# Patient Record
Sex: FEMALE | Race: WHITE
[De-identification: names, ages, dates, MRNs, and addresses within clinical notes are randomized per-mention and may not be internally consistent; named-entity substitution may affect disease eponyms.]

---

## 2017-05-12 NOTE — ER DOCUMENT REPORT
ED General





- General


Chief Complaint: Rectal Bleeding


Stated Complaint: BLOOD IN STOOL,ABDOMINAL PAIN,FEVER


Time Seen by Provider: 17 22:23


Notes: 


Patient is a 26-year-old female who presents with 1 year of rectal bleeding, 

daily abdominal pain, no prior GI evaluation although she was evaluated in the 

emergency department on 2 separate occasions approximately one year ago with a 

CT scan of the abdomen and pelvis 2 with noted to be normal and presents with 

concerns that her stool now also contains mucus.  She states that she often has 

bloody, loose bowel movements but has not ever had mucus in the stool.  She 

also notes an associated diffuse abdominal cramping that is moderate in nature.

  Nothing improves or worsens her symptoms.  She denies any associated vomiting 

but does note associated nausea.  Notes that shes had a subjective fever at 

home but has not recorded temperature.  She denies any dysuria, vaginal 

bleeding or vaginal discharge.  No sitter abdominal pain today is unchanged 

from her daily abdominal pain.  States she was unable to follow-up with GI 

medicine last year due to lack of insurance coverage which she now has.





TRAVEL OUTSIDE OF THE [place] IN LAST 30 DAYS: No





- Related Data


Allergies/Adverse Reactions: 


 





latex [Latex] Allergy (Severe, Verified 16 09:34)


 swelling











Past Medical History





- General


Information source: Patient





- Social History


Smoking Status: Never Smoker


Frequency of alcohol use: None


Drug Abuse: None


Lives with: Spouse/Significant other


Family History: Reviewed & Not Pertinent


Patient has suicidal ideation: No


Patient has homicidal ideation: No





- Past Medical History


Cardiac Medical History: Reports: Hx Heart Murmur - hx of


   Denies: Hx Hypertension


Neurological Medical History: Denies: Hx Cerebrovascular Accident, Hx Seizures


Renal/ Medical History: Denies: Hx Peritoneal Dialysis


GI Medical History: Reports: Hx Gastroesophageal Reflux Disease, Hx Ulcer - 

gastric hx of.  Denies: Hx Hiatal Hernia


Psychiatric Medical History: 


Infectious Medical History: Denies: Hx HIV


Past Surgical History: Reports: Hx  Section - x 3, Hx Tubal Ligation





- Immunizations


Immunizations up to date: Yes


Hx Diphtheria, Pertussis, Tetanus Vaccination: Yes - 





Review of Systems





- Review of Systems


Notes: 


Constitutional: Negative for fever.


HENT: Negative for sore throat.


Eyes: Negative for visual changes.


Cardiovascular: Negative for chest pain.


Respiratory: Negative for shortness of breath.


Gastrointestinal: Positive for abdominal pain and hematochezia


Genitourinary: Negative for dysuria.


Musculoskeletal: Negative for back pain.


Skin: Negative for rash.


Neurological: Negative for headaches, weakness or numbness.





10 point ROS negative except as marked above and in HPI.





Physical Exam





- Vital signs


Vitals: 


 











Temp Pulse Resp BP Pulse Ox


 


 97.9 F   60   20   127/73 H  96 


 


 17 21:13  17 21:13  17 21:13  17 21:13  17 21:13











Interpretation: Normal


Notes: 


PHYSICAL EXAMINATION:





GENERAL: Well-appearing, well-nourished and in no acute distress.





HEAD: Atraumatic, normocephalic.





EYES: Pupils equal round and reactive to light, extraocular movements intact, 

sclera anicteric, conjunctiva are normal.





ENT: nares patent, oropharynx clear without exudates.  Moist mucous membranes.





NECK: Normal range of motion, supple without lymphadenopathy





LUNGS: Breath sounds clear to auscultation bilaterally and equal.  No wheezes 

rales or rhonchi.





HEART: Regular rate and rhythm without murmurs





ABDOMEN: Soft, nontender, normoactive bowel sounds.  No guarding, no rebound.  

No masses appreciated.





Rectal: No gross blood or masses.  Brown stool.





EXTREMITIES: Normal range of motion, no pitting or edema.  No cyanosis.





NEUROLOGICAL: No focal neurological deficits. Moves all extremities 

spontaneously and on command.





PSYCH: Normal mood, normal affect.





SKIN: Warm, Dry, normal turgor, no rashes or lesions noted.





Course





- Re-evaluation


Re-evalutation: 


17 23:13


Patient presents with nearly one year of rectal bleeding and nearly daily 

abdominal pain.  She presents today now with some mucus in the stool which is 

approximately come to the emergency department.  She isn't nauseated without 

vomiting.  Overall no focal abdominal tenderness on exam and she is otherwise 

well in appearance, vitals normal limits.  Primary concern at this time would 

be for an inflammatory bowel disease given the duration of patient's symptoms 

and she has not yet been able to follow-up with GI.  However, patient now has 

insurance she should be able to get expeditious outpatient GI evaluation 

including colonoscopy which I believe is the most diagnostically appropriate 

tests.  Rectal exam here without any ranjan blood.  Labs are otherwise 

unremarkable plan for discharge home with recommendations for close outpatient 

GI follow-up.








17 23:47


Laboratories overall unremarkable.  Stool guaiac is negative.  I have recommend 

close GI follow-up.At this time will discharge with return precautions and 

follow-up recommendations.  Verbal discharge instructions given a the bedside 

and opportunity for questions given. Medication warnings reviewed. Patient is 

in agreement with this plan and has verbalized understanding of return 

precautions and the need for primary care follow-up in the next 24-72 hours.





- Vital Signs


Vital signs: 


 











Temp Pulse Resp BP Pulse Ox


 


 97.9 F   60   12   123/67   97 


 


 17 21:13  17 21:13  17 23:45  17 23:01  17 23:45














- Laboratory


Result Diagrams: 


 17 23:01





 17 23:01


Laboratory results interpreted by me: 


 











  17





  23:01


 


Plt Count  100 L














Discharge





- Discharge


Clinical Impression: 


 Rectal bleeding





Abdominal pain


Qualifiers:


 Abdominal location: generalized Qualified Code(s): R10.84 - Generalized 

abdominal pain





Condition: Good


Disposition: HOME, SELF-CARE


Additional Instructions: 


You need to follow-up with GI medicine urgently given the duration of your 

symptoms.  Your labs are otherwise normal today.  Please take the Zofran that 

has been given to you as needed for nausea.  Return if you have a fever greater 

than 101F, pass out, have worsening abdominal pain, or have any other symptoms 

that are worrisome to you.

## 2017-10-03 NOTE — ER DOCUMENT REPORT
ED Medical Screen (RME)





- General


Chief Complaint: Chest Pain


Stated Complaint: CHEST PAIN


Time Seen by Provider: 10/03/17 01:02


Mode of Arrival: Ambulatory


Information source: Patient


Notes: 





Patient presents complaining of right-sided chest pain for the past 2 days.  

Patient does complain of nausea with headache.  Patient states that she had a 

syncopal episode around 7 PM while standing today.  Patient denies any cough or 

cold symptoms.  Patient does report a family history history of early heart 

disease.





hx: Tubal ligation, 


TRAVEL OUTSIDE OF THE [place] IN LAST 30 DAYS: No





- Related Data


Allergies/Adverse Reactions: 


 





latex [Latex] Allergy (Severe, Verified 16 09:34)


 swelling











Past Medical History





- Past Medical History


Cardiac Medical History: Reports: Hx Heart Murmur - hx of


   Denies: Hx Hypertension


Neurological Medical History: Denies: Hx Cerebrovascular Accident, Hx Seizures


Renal/ Medical History: Denies: Hx Peritoneal Dialysis


GI Medical History: Reports: Hx Gastroesophageal Reflux Disease, Hx Ulcer - 

gastric hx of.  Denies: Hx Hiatal Hernia


Psychiatric Medical History: 


Infectious Medical History: Denies: Hx HIV


Past Surgical History: Reports: Hx  Section - x 3, Hx Tubal Ligation





- Immunizations


Immunizations up to date: Yes


Hx Diphtheria, Pertussis, Tetanus Vaccination: Yes - 





Physical Exam





- Vital signs


Vitals: 





 











Temp Pulse Resp BP Pulse Ox


 


 98.3 F   88   18   121/72   96 


 


 10/02/17 23:23  10/02/17 23:23  10/02/17 23:23  10/02/17 23:23  10/02/17 23:23














Course





- Vital Signs


Vital signs: 





 











Temp Pulse Resp BP Pulse Ox


 


 98.3 F   88   18   121/72   96 


 


 10/02/17 23:23  10/02/17 23:23  10/02/17 23:23  10/02/17 23:23  10/02/17 23:23

## 2017-10-03 NOTE — ER DOCUMENT REPORT
ED General





- General


Chief Complaint: Chest Pain


Stated Complaint: CHEST PAIN


Time Seen by Provider: 10/03/17 01:02


Mode of Arrival: Ambulatory


Notes: 





Patient is a 26-year-old female presents with complaint of pain that goes from 

her right upper chest into her right shoulder and upper right neck into the 

back of her head and then down her right arm.  She said the pain started in her 

right chest and shoulder area before it started to radiate down her arm and up 

into her head.  She said she also passed out today around 7 PM.  She said she 

got up to go get water.  She says she passed out when she walked to the sink.  

No fevers.  No vomiting.  No trauma.  No diarrhea.  No history of cardiac 

issues.  She does not take any medications.  She is otherwise healthy.


TRAVEL OUTSIDE OF THE [place] IN LAST 30 DAYS: No





- Related Data


Allergies/Adverse Reactions: 


 





latex [Latex] Allergy (Severe, Verified 16 09:34)


 swelling











Past Medical History





- General


Information source: Patient





- Social History


Smoking Status: Unknown if Ever Smoked


Frequency of alcohol use: None


Drug Abuse: None


Family History: Reviewed & Not Pertinent


Patient has suicidal ideation: No


Patient has homicidal ideation: No





- Past Medical History


Cardiac Medical History: Reports: Hx Heart Murmur - hx of


   Denies: Hx Hypertension


Neurological Medical History: Denies: Hx Cerebrovascular Accident, Hx Seizures


Renal/ Medical History: Denies: Hx Peritoneal Dialysis


GI Medical History: Reports: Hx Gastroesophageal Reflux Disease, Hx Ulcer - 

gastric hx of.  Denies: Hx Hiatal Hernia


Psychiatric Medical History: 


Infectious Medical History: Denies: Hx HIV


Past Surgical History: Reports: Hx  Section - x 3, Hx Tubal Ligation





- Immunizations


Immunizations up to date: Yes


Hx Diphtheria, Pertussis, Tetanus Vaccination: Yes - 





Review of Systems





- Review of Systems


Notes: 





My Normal Review Basic





REVIEW OF SYSTEMS:


CONSTITUTIONAL :  Denies fever,  chills, or sweats.  Denies recent illness.


EENT:   Denies eye, ear, throat, or mouth pain or symptoms.  Denies nasal or 

sinus congestion.


CARDIOVASCULAR: Right upper chest pain


RESPIRATORY:  Denies cough, cold, or chest congestion.  Denies shortness of 

breath, difficulty breathing, or wheezing.


GASTROINTESTINAL:  Denies abdominal pain.  Denies nausea, vomiting, or 

diarrhea.  Denies constipation.  Last BM: 


MUSCULOSKELETAL: Right shoulder pain 


SKIN:   Denies rash or skin lesions..


NEUROLOGICAL:  Denies altered mental status or loss of consciousness.  Denies 

headache.  Denies weakness or paralysis or loss of use of either side.  Denies 

problems with gait or speech.  Denies sensory or motor loss.


ALL OTHER SYSTEMS REVIEWED AND NEGATIVE.





Physical Exam





- Vital signs


Vitals: 


 











Temp Pulse Resp BP Pulse Ox


 


 98.3 F   88   18   121/72   96 


 


 10/02/17 23:23  10/02/17 23:23  10/02/17 23:23  10/02/17 23:23  10/02/17 23:23














- Notes


Notes: 





General Appearance: Well nourished, alert, cooperative, no acute distress, 

moderate obvious discomfort.


Vitals: reviewed, See vital signs table.


Head: no swelling or tenderness to the head


Eyes: PERRL, EOMI, Conjuctiva clear


Mouth: No decreasd moisture


Chest wall: Pain to palpation over the right upper chest wall near the shoulder.


Neck: Supple, pain to palpation over the right trapezius muscle going into the 

right cervical paraspinal musculature.


Lungs: No wheezing, No rales, No rhonci, No accessory muscle use, good air 

exchange bilaterally.


Heart: Normal rate, Regular rythm, No murmur, no rub


Abdomen: Normal BS, soft, No rigidity, No abdominal tenderness, No guarding, no 

rebound, no abdominal masses, no organomegaly


Extremities: strength 5/5 in all extremities, good pulses in all extremities, 

pain to palpation of the right shoulder and right trapezius muscle.  Pain with 

movement of right shoulder., no edema.


Skin: warm, dry, appropriate color, no rash


Neuro: speech clear, oriented x 3, normal affect, responds appropriately to 

questions.





Course





- Re-evaluation


Re-evalutation: 





10/03/17 03:52


On exam patient's pain is easily reproducible with palpation does follow a 

cervical type pattern that the pain starts along the trapezius muscles and 

cervical paraspinal musculature and then radiates up into her head and then 

down into her right shoulder down her right arm and into her right upper chest.

  All these areas are very tender to touch.  I did obtain a CT of her chest 

being that the pain did come on suddenly and does have this lateralizing type 

of symptom from chest area and therefore I want to rule out possibility of 

dissection.  CT was negative.  This time patient is safe to be discharged home.

  I encouraged her return to ER immediately if she has worsening pain, focal to 

bleeding, fevers, or feels unwell.  Patient will be placed on muscle relaxer as 

well as anti-inflammatory medications.  Patient agrees with plan and will be 

discharged home.





Dictation of this chart was performed using voice recognition software; 

therefore, there may be some unintended grammatical errors.





- Vital Signs


Vital signs: 


 











Temp Pulse Resp BP Pulse Ox


 


 98.3 F   88   21 H  121/72   96 


 


 10/02/17 23:23  10/02/17 23:23  10/03/17 02:45  10/02/17 23:23  10/03/17 02:45














- Laboratory


Result Diagrams: 


 10/03/17 01:45





 10/03/17 01:45


Laboratory results interpreted by me: 


 











  10/03/17 10/03/17 10/03/17





  01:35 01:45 01:45


 


Plt Count   101 L 


 


Chloride    109 H


 


Urine Blood  SMALL H  


 


Urine Urobilinogen  4.0 H  


 


Ur Leukocyte Esterase  MODERATE H  














- EKG Interpretation by Me


Additional EKG results interpreted by me: 





10/03/17 01:49


EKG is reviewed and interpreted by me.  EKG shows normal sinus rhythm with rate 

of 81 bpm.  No ST segment elevation or depression.  No ischemic T-wave 

inversions.  DE interval, QRS duration, QTc intervals are within normal range.  

No old EKG available for comparison.


10/03/17 02:57





EKG #2 is reviewed and interpreted by me.  EKG shows normal sinus rhythm with a 

rate of 62 bpm.  No ST segment elevation or depression.  Lead V5 is a large 

amount of artifact making it difficult to interpret.  No ischemic appearing T 

waves.  DE interval, QRS duration, QTc intervals are within normal range.





Discharge





- Discharge


Clinical Impression: 


 Neck pain





Headache


Qualifiers:


 Headache type: unspecified Headache chronicity pattern: acute headache 

Intractability: not intractable Qualified Code(s): R51 - Headache





Chest pain


Qualifiers:


 Chest pain type: unspecified Qualified Code(s): R07.9 - Chest pain, unspecified





Condition: Good


Disposition: HOME, SELF-CARE


Additional Instructions: 


Your laboratory evaluation does not show any concerning abnormalities.  CT scan 

was performed of your chest to rule out blood clots in your lungs and something 

called an aortic dissection.  The CT scan was normal.  Your pain on exam seems 

very consistent with that of pain radiating from the neck.  I will place you on 

muscle relaxers and anti-inflammatories for the next few days.  We will have 

you use warm compresses.  I will give you a few days off work.  I encourage you 

to return to the ER if you have worsening pain, difficulty breathing, or fevers.


Prescriptions: 


Cyclobenzaprine HCl [Flexeril 10 mg Tablet] 10 mg PO TIDP PRN #15 tab


 PRN Reason: 


Naproxen [Naprosyn 250 mg Tablet] 250 mg PO BID #15 tablet


Forms:  Return to Work

## 2017-10-03 NOTE — RADIOLOGY REPORT (SQ)
EXAM: CT ANGIOGRAM OF THE CHEST USING INTRAVENOUS ADMINISTRATION

OF NONIONIC IODINATED CONTRAST MATERIAL.



CLINICAL INDICATION: Chest pain. Right shoulder pain. Right neck

pain.



COMPARISON:  Today's chest radiographs.



TECHNIQUE: Using helical technique, thin section axial images

were performed through the chest after the uncomplicated

intravenous administration of 100 mL Isovue-370.  Axially

acquired data was then transferred to a dedicated CT workstation

to facilitate parasagittal, coronal and volumetric 3-D

reconstructions.  Representative volumetric 3-D reconstructions

for this examination were permanently stored on the PACS system.



FINDINGS: Great vessels of the chest are normal. No pulmonary

embolism. No thoracic aortic aneurysm or dissection given cardiac

motion artifact. Greatest sagittal diameter of the ascending

thoracic aorta measures 2.2 cm.



Cardiac size and contour normal. No pericardial effusion.

Probable right basilar scar versus atelectasis. Lungs are

otherwise clear. No pleural effusions or pneumothorax.



Bones are normal.



IMPRESSION:

Minimal right basilar scar versus atelectasis. Otherwise, normal

chest CTA examination. No pulmonary embolism or thoracic aortic

aneurysm/dissection.

## 2017-10-03 NOTE — RADIOLOGY REPORT (SQ)
EXAM DESCRIPTION: 



CHEST PA/LAT



CLINICAL HISTORY: 



26 years, Female, cp



COMPARISON:

Chest radiographs of December 30, 2016.



NUMBER OF VIEWS:

2



TECHNIQUE:

Radiographic technique.



LIMITATIONS:

None.



FINDINGS:



Cardiac size and pulmonary vasculature are normal. Lungs are

clear. No pleural effusions or pneumothorax. Bones are intact on

this single view. No free peritoneal gas layering under either

hemidiaphragm. 



IMPRESSION:



Normal chest radiographs.

 



 2011 [employer]- All Rights Reserved

## 2018-05-18 ENCOUNTER — HOSPITAL ENCOUNTER (EMERGENCY)
Dept: HOSPITAL 62 - ER | Age: 28
Discharge: HOME | End: 2018-05-18
Payer: SELF-PAY

## 2018-05-18 VITALS — DIASTOLIC BLOOD PRESSURE: 80 MMHG | SYSTOLIC BLOOD PRESSURE: 115 MMHG

## 2018-05-18 DIAGNOSIS — N39.0: Primary | ICD-10-CM

## 2018-05-18 DIAGNOSIS — Z98.51: ICD-10-CM

## 2018-05-18 DIAGNOSIS — Z91.040: ICD-10-CM

## 2018-05-18 DIAGNOSIS — R10.30: ICD-10-CM

## 2018-05-18 LAB
ADD MANUAL DIFF: NO
ALBUMIN SERPL-MCNC: 4.4 G/DL (ref 3.5–5)
ALP SERPL-CCNC: 54 U/L (ref 38–126)
ALT SERPL-CCNC: 23 U/L (ref 9–52)
ANION GAP SERPL CALC-SCNC: 14 MMOL/L (ref 5–19)
APPEARANCE UR: (no result)
APTT PPP: YELLOW S
AST SERPL-CCNC: 16 U/L (ref 14–36)
BASOPHILS # BLD AUTO: 0 10^3/UL (ref 0–0.2)
BASOPHILS NFR BLD AUTO: 0.3 % (ref 0–2)
BILIRUB DIRECT SERPL-MCNC: 0.3 MG/DL (ref 0–0.4)
BILIRUB SERPL-MCNC: 0.7 MG/DL (ref 0.2–1.3)
BILIRUB UR QL STRIP: NEGATIVE
BUN SERPL-MCNC: 16 MG/DL (ref 7–20)
CALCIUM: 9.8 MG/DL (ref 8.4–10.2)
CHLORIDE SERPL-SCNC: 107 MMOL/L (ref 98–107)
CO2 SERPL-SCNC: 24 MMOL/L (ref 22–30)
EOSINOPHIL # BLD AUTO: 0.2 10^3/UL (ref 0–0.6)
EOSINOPHIL NFR BLD AUTO: 2.5 % (ref 0–6)
ERYTHROCYTE [DISTWIDTH] IN BLOOD BY AUTOMATED COUNT: 13.7 % (ref 11.5–14)
GLUCOSE SERPL-MCNC: 95 MG/DL (ref 75–110)
GLUCOSE UR STRIP-MCNC: NEGATIVE MG/DL
HCT VFR BLD CALC: 43 % (ref 36–47)
HGB BLD-MCNC: 14.7 G/DL (ref 12–15.5)
KETONES UR STRIP-MCNC: NEGATIVE MG/DL
LIPASE SERPL-CCNC: 45.9 U/L (ref 23–300)
LYMPHOCYTES # BLD AUTO: 2.1 10^3/UL (ref 0.5–4.7)
LYMPHOCYTES NFR BLD AUTO: 21.7 % (ref 13–45)
MCH RBC QN AUTO: 30.6 PG (ref 27–33.4)
MCHC RBC AUTO-ENTMCNC: 34.1 G/DL (ref 32–36)
MCV RBC AUTO: 90 FL (ref 80–97)
MONOCYTES # BLD AUTO: 0.7 10^3/UL (ref 0.1–1.4)
MONOCYTES NFR BLD AUTO: 7.2 % (ref 3–13)
NEUTROPHILS # BLD AUTO: 6.7 10^3/UL (ref 1.7–8.2)
NEUTS SEG NFR BLD AUTO: 68.3 % (ref 42–78)
NITRITE UR QL STRIP: NEGATIVE
PH UR STRIP: 5 [PH] (ref 5–9)
PLATELET # BLD: 123 10^3/UL (ref 150–450)
POTASSIUM SERPL-SCNC: 4.3 MMOL/L (ref 3.6–5)
PROT SERPL-MCNC: 6.7 G/DL (ref 6.3–8.2)
PROT UR STRIP-MCNC: NEGATIVE MG/DL
RBC # BLD AUTO: 4.8 10^6/UL (ref 3.72–5.28)
SODIUM SERPL-SCNC: 144.6 MMOL/L (ref 137–145)
SP GR UR STRIP: 1.02
TOTAL CELLS COUNTED % (AUTO): 100 %
UROBILINOGEN UR-MCNC: NEGATIVE MG/DL (ref ?–2)
WBC # BLD AUTO: 9.8 10^3/UL (ref 4–10.5)

## 2018-05-18 PROCEDURE — 99284 EMERGENCY DEPT VISIT MOD MDM: CPT

## 2018-05-18 PROCEDURE — 96375 TX/PRO/DX INJ NEW DRUG ADDON: CPT

## 2018-05-18 PROCEDURE — 36415 COLL VENOUS BLD VENIPUNCTURE: CPT

## 2018-05-18 PROCEDURE — 82272 OCCULT BLD FECES 1-3 TESTS: CPT

## 2018-05-18 PROCEDURE — 81001 URINALYSIS AUTO W/SCOPE: CPT

## 2018-05-18 PROCEDURE — 74177 CT ABD & PELVIS W/CONTRAST: CPT

## 2018-05-18 PROCEDURE — 80053 COMPREHEN METABOLIC PANEL: CPT

## 2018-05-18 PROCEDURE — 81025 URINE PREGNANCY TEST: CPT

## 2018-05-18 PROCEDURE — 83690 ASSAY OF LIPASE: CPT

## 2018-05-18 PROCEDURE — 96365 THER/PROPH/DIAG IV INF INIT: CPT

## 2018-05-18 PROCEDURE — 85025 COMPLETE CBC W/AUTO DIFF WBC: CPT

## 2018-05-18 NOTE — RADIOLOGY REPORT (SQ)
EXAM DESCRIPTION:  CT ABD/PELVIS WITH IV ONLY



COMPLETED DATE/TIME:  5/18/2018 12:02 pm



REASON FOR STUDY:  RLQ pain; eval appendix



COMPARISON:  5/30/2016.



TECHNIQUE:  CT scan of the abdomen and pelvis performed using helical scanning technique with dynamic
 intravenous contrast injection.  No oral contrast. Images reviewed with lung, soft tissue, and bone 
windows. Reconstructed coronal and sagittal MPR images reviewed. Delayed images for evaluation of the
 urinary system also acquired. All images stored on PACS.

All CT scanners at this facility use dose modulation, iterative reconstruction, and/or weight based d
osing when appropriate to reduce radiation dose to as low as reasonably achievable (ALARA).

[facility]: Dose Right  [facility]: CareDose    [facility]: Dose Right    [facility]: Teradose 4D    [facility]: [employer] Technologies



CONTRAST TYPE AND DOSE:  contrast/concentration: Isovue 370.00 mg/ml; Total Contrast Delivered: 64.0 
ml; Total Saline Delivered: 65.0 ml



RENAL FUNCTION:  None required. The patient is less than 50 years old.



RADIATION DOSE:  CT Rad equipment meets quality standard of care and radiation dose reduction techniq
ues were employed. CTDIvol: 5.0 - 6.3 mGy. DLP: 594 mGy-cm..



LIMITATIONS:  None.



FINDINGS:  LOWER CHEST: No significant findings. No nodules or infiltrates.

LIVER: Normal size. No masses.  No dilated ducts.

SPLEEN: Normal size. No focal lesions.

PANCREAS: No masses. No significant calcifications. No adjacent inflammation or peripancreatic fluid 
collections. Pancreatic duct not dilated.

GALLBLADDER: No identified stones by CT criteria. No inflammatory changes to suggest cholecystitis.

ADRENAL GLANDS: No significant masses or asymmetry.

RIGHT KIDNEY AND URETER: No solid masses.   No significant calcifications.   No hydronephrosis or hyd
roureter.

LEFT KIDNEY AND URETER: No solid masses.   No significant calcifications.   No hydronephrosis or hydr
oureter.

AORTA AND VESSELS: No aneurysm. No dissection. Renal arteries, SMA, celiac without stenosis.

RETROPERITONEUM: No retroperitoneal adenopathy, hemorrhage or masses.

BOWEL AND PERITONEAL CAVITY: No masses or inflammatory changes. No free fluid or peritoneal masses.

APPENDIX: Normal.

PELVIS: No mass.  No free fluid. Normal bladder.

ABDOMINAL WALL: No masses. No hernias.

BONES: No significant or acute findings.

OTHER: No other significant finding.



IMPRESSION:  NO SIGNIFICANT OR ACUTE FINDING IN THE ABDOMEN OR PELVIS ON CT SCAN WITH IV CONTRAST.



TECHNICAL DOCUMENTATION:  JOB ID:  [id number]

Quality ID # [id number]: Final reports with documentation of one or more dose reduction techniques (e.g., Au
tomated exposure control, adjustment of the mA and/or kV according to patient size, use of iterative 
reconstruction technique)

 2011 [employer]- All Rights Reserved



Reading location - IP/workstation name: [email or web address]

## 2018-05-18 NOTE — ER DOCUMENT REPORT
ED GI/





- General


Chief Complaint: Abdominal Pain


Stated Complaint: ABDOMINAL PAIN


Time Seen by Provider: 18 10:26


Mode of Arrival: Ambulatory


Information source: Patient


Notes: 





Pt is a 27 year old female who presents to the ER today for 5 days of 

progressively worsening lower abdominal pain with mucous in her stool. Pt 

states her stool is normal, just there's mucous in it. She denies dysuria, 

abnormal vaginal discharge, nausea, vomiting, fever, chills. She states that 

the lower abdominal pain is worse in the center, but hurts all across her lower 

abdomen and that she can't eat because of the pain. She states the pain starts 

"as soon as I eat something." She points to her lower abdomen when she says 

this. She does state that her stools have been dark and tarry. She does admit 

that she was taking pepto bismol during the first three days. 


TRAVEL OUTSIDE OF THE [place] IN LAST 30 DAYS: No





- Related Data


Allergies/Adverse Reactions: 


 





latex [Latex] Allergy (Severe, Verified 16 09:34)


 swelling











Past Medical History





- General


Information source: Patient





- Social History


Smoking Status: Current Every Day Smoker


Frequency of alcohol use: Rare


Drug Abuse: None


Family History: Reviewed & Not Pertinent


Patient has suicidal ideation: No


Patient has homicidal ideation: No





- Past Medical History


Cardiac Medical History: Reports: Hx Heart Murmur - hx of


   Denies: Hx Hypertension


Neurological Medical History: Denies: Hx Cerebrovascular Accident, Hx Seizures


Renal/ Medical History: Denies: Hx Peritoneal Dialysis


GI Medical History: Reports: Hx Gastroesophageal Reflux Disease, Hx Ulcer - 

gastric hx of.  Denies: Hx Hiatal Hernia


Psychiatric Medical History: 


Infectious Medical History: Denies: Hx HIV


Past Surgical History: Reports: Hx  Section - x 3, Hx Tubal Ligation





- Immunizations


Immunizations up to date: Yes


Hx Diphtheria, Pertussis, Tetanus Vaccination: Yes - 





Review of Systems





- Review of Systems


Constitutional: No symptoms reported


EENT: No symptoms reported


Cardiovascular: No symptoms reported


Respiratory: No symptoms reported


Gastrointestinal: See HPI


Genitourinary: No symptoms reported


Female Genitourinary: No symptoms reported


Musculoskeletal: No symptoms reported


Skin: No symptoms reported


Hematologic/Lymphatic: No symptoms reported


Neurological/Psychological: No symptoms reported





Physical Exam





- Vital signs


Vitals: 


 











Temp Pulse Resp BP Pulse Ox


 


 98.9 F   82   16   120/82   98 


 


 05/18/18 10:13  18 10:13  18 10:13  18 10:13  18 10:13














- Notes


Notes: 





PHYSICAL EXAMINATION: 


GENERAL: Well-appearing, smiling, and in no acute distress. 


HEAD: Atraumatic, normocephalic. 


EYES: Pupils equal round and reactive to light, extraocular movements intact, 

sclera anicteric, conjunctiva are normal. 


NECK: Normal range of motion, supple without lymphadenopathy 


LUNGS: CTAB and equal. No wheezes rales or rhonchi. 


HEART: Regular rate and rhythm without murmurs


ABDOMEN: Soft, suprapubic tenderness. No guarding, no rebound 


BACK: no vertebral tenderness, normal ROM


Rectal: no hemorrhoids noted, no bleeding, normal tone, normal tenderness


GI/: no CVA tenderness


EXTREMITIES: Normal range of motion, no pitting edema. No cyanosis. 


NEUROLOGICAL: Cranial nerves grossly intact. Normal sensory/motor exams. 


PSYCH: Normal mood, normal affect. 


SKIN: Warm, Dry, normal turgor, no rashes or lesions noted 





Course





- Re-evaluation


Re-evalutation: 





18 21:09


labwork is unremarkable today except for some WBC and leukocytes, blood on UA 

today. Treated pt with rocephin while here. GI cocktail did improve her pain. I 

suspect pt likely has IBS, but she needs to get that confirmed by her pcp. 

stool occult negative. pt sent home of keflex for uti and bentyl for IBS pain. 





- Vital Signs


Vital signs: 


 











Temp Pulse Resp BP Pulse Ox


 


 98.0 F   71   16   115/80   97 


 


 18 15:02  18 15:02  18 15:02  18 15:02  18 15:02














- Laboratory


Result Diagrams: 


 18 10:54





 18 10:54


Laboratory results interpreted by me: 


 











  18





  10:54 10:54 10:54


 


Plt Count  123 L  


 


Creatinine   0.51 L 


 


Urine Blood    SMALL H


 


Ur Leukocyte Esterase    MODERATE H














Discharge





- Discharge


Clinical Impression: 


Abdominal pain


Qualifiers:


 Abdominal location: unspecified location Qualified Code(s): R10.9 - 

Unspecified abdominal pain





UTI (urinary tract infection)


Qualifiers:


 Urinary tract infection type: site unspecified Hematuria presence: without 

hematuria Qualified Code(s): N39.0 - Urinary tract infection, site not specified





Condition: Stable


Disposition: HOME, SELF-CARE


Additional Instructions: 


Return immediately for any new or worsening symptoms.





Follow up with primary care provider, call tomorrow to make followup 

appointment.


Prescriptions: 


Cephalexin Monohydrate [Keflex 500 mg Capsule] 500 mg PO BID 7 Days #14 capsule


Dicyclomine HCl [Bentyl 20 mg Tablet] 20 mg PO BID #20 tablet

## 2018-05-18 NOTE — ER DOCUMENT REPORT
ED Medical Screen (RME)





- General


Chief Complaint: Abdominal Pain


Stated Complaint: ABDOMINAL PAIN


Time Seen by Provider: 18 10:26


Notes: 





RAPID MEDICAL EVALUATION DISCLOSURE


I have seen this patient as part of a Rapid Medical Evaluation and, if 

applicable, placed any initially appropriate orders. The patient will be seen 

and fully evaluated, including a full history and physical exam, by a provider (

in Main ED or Fast Track) when a room becomes available.





------------------------------------------------------------------





27-year-old female here with complaints of lower abdominal pain nausea vomiting 

diarrhea ongoing for the past 5 days.  She reports that pain is worse with 

walking moving and car rides.  She is tried Pepto-Bismol but is unable to keep 

it down.  Denies vaginal bleeding discharge dysuria hematuria frequency 

hesitancy.





EXAM


Exquisitely tender right lower quadrant


Minimal tenderness suprapubic region


No tenderness left lower or pan-upper quadrants


TRAVEL OUTSIDE OF THE [place] IN LAST 30 DAYS: No





- Related Data


Allergies/Adverse Reactions: 


 





latex [Latex] Allergy (Severe, Verified 16 09:34)


 swelling











Past Medical History





- Past Medical History


Cardiac Medical History: Reports: Hx Heart Murmur - hx of


   Denies: Hx Hypertension


Neurological Medical History: Denies: Hx Cerebrovascular Accident, Hx Seizures


Renal/ Medical History: Denies: Hx Peritoneal Dialysis


GI Medical History: Reports: Hx Gastroesophageal Reflux Disease, Hx Ulcer - 

gastric hx of.  Denies: Hx Hiatal Hernia


Psychiatric Medical History: 


Infectious Medical History: Denies: Hx HIV


Past Surgical History: Reports: Hx  Section - x 3, Hx Tubal Ligation





- Immunizations


Immunizations up to date: Yes


Hx Diphtheria, Pertussis, Tetanus Vaccination: Yes - 





Physical Exam





- Vital signs


Vitals: 





 











Temp Pulse Resp BP Pulse Ox


 


 98.9 F   82   16   120/82   98 


 


 18 10:13  18 10:13  18 10:13  18 10:13  18 10:13














Course





- Vital Signs


Vital signs: 





 











Temp Pulse Resp BP Pulse Ox


 


 98.9 F   82   16   120/82   98 


 


 18 10:13  18 10:13  18 10:13  18 10:13  18 10:13

## 2018-05-23 ENCOUNTER — HOSPITAL ENCOUNTER (EMERGENCY)
Dept: HOSPITAL 62 - ER | Age: 28
Discharge: HOME | End: 2018-05-23
Payer: SELF-PAY

## 2018-05-23 VITALS — DIASTOLIC BLOOD PRESSURE: 51 MMHG | SYSTOLIC BLOOD PRESSURE: 111 MMHG

## 2018-05-23 DIAGNOSIS — R11.2: ICD-10-CM

## 2018-05-23 DIAGNOSIS — R19.7: ICD-10-CM

## 2018-05-23 DIAGNOSIS — R14.2: ICD-10-CM

## 2018-05-23 DIAGNOSIS — R10.84: Primary | ICD-10-CM

## 2018-05-23 DIAGNOSIS — R63.0: ICD-10-CM

## 2018-05-23 LAB
ADD MANUAL DIFF: NO
ALBUMIN SERPL-MCNC: 3.9 G/DL (ref 3.5–5)
ALP SERPL-CCNC: 55 U/L (ref 38–126)
ALT SERPL-CCNC: 26 U/L (ref 9–52)
ANION GAP SERPL CALC-SCNC: 12 MMOL/L (ref 5–19)
APPEARANCE UR: (no result)
APTT PPP: YELLOW S
AST SERPL-CCNC: 14 U/L (ref 14–36)
BASOPHILS # BLD AUTO: 0.1 10^3/UL (ref 0–0.2)
BASOPHILS NFR BLD AUTO: 0.5 % (ref 0–2)
BILIRUB DIRECT SERPL-MCNC: 0.2 MG/DL (ref 0–0.4)
BILIRUB SERPL-MCNC: 0.2 MG/DL (ref 0.2–1.3)
BILIRUB UR QL STRIP: NEGATIVE
BUN SERPL-MCNC: 14 MG/DL (ref 7–20)
CALCIUM: 9 MG/DL (ref 8.4–10.2)
CHLORIDE SERPL-SCNC: 113 MMOL/L (ref 98–107)
CO2 SERPL-SCNC: 21 MMOL/L (ref 22–30)
EOSINOPHIL # BLD AUTO: 0.4 10^3/UL (ref 0–0.6)
EOSINOPHIL NFR BLD AUTO: 3.5 % (ref 0–6)
ERYTHROCYTE [DISTWIDTH] IN BLOOD BY AUTOMATED COUNT: 13.6 % (ref 11.5–14)
GLUCOSE SERPL-MCNC: 96 MG/DL (ref 75–110)
GLUCOSE UR STRIP-MCNC: NEGATIVE MG/DL
HCT VFR BLD CALC: 40.4 % (ref 36–47)
HGB BLD-MCNC: 13.7 G/DL (ref 12–15.5)
KETONES UR STRIP-MCNC: NEGATIVE MG/DL
LIPASE SERPL-CCNC: 61.9 U/L (ref 23–300)
LYMPHOCYTES # BLD AUTO: 2.7 10^3/UL (ref 0.5–4.7)
LYMPHOCYTES NFR BLD AUTO: 27.2 % (ref 13–45)
MCH RBC QN AUTO: 30.5 PG (ref 27–33.4)
MCHC RBC AUTO-ENTMCNC: 34.1 G/DL (ref 32–36)
MCV RBC AUTO: 90 FL (ref 80–97)
MONOCYTES # BLD AUTO: 0.8 10^3/UL (ref 0.1–1.4)
MONOCYTES NFR BLD AUTO: 7.7 % (ref 3–13)
NEUTROPHILS # BLD AUTO: 6.2 10^3/UL (ref 1.7–8.2)
NEUTS SEG NFR BLD AUTO: 61.1 % (ref 42–78)
NITRITE UR QL STRIP: NEGATIVE
PH UR STRIP: 5 [PH] (ref 5–9)
PLATELET # BLD: 114 10^3/UL (ref 150–450)
POTASSIUM SERPL-SCNC: 4 MMOL/L (ref 3.6–5)
PROT SERPL-MCNC: 6.4 G/DL (ref 6.3–8.2)
PROT UR STRIP-MCNC: NEGATIVE MG/DL
RBC # BLD AUTO: 4.5 10^6/UL (ref 3.72–5.28)
SODIUM SERPL-SCNC: 145.5 MMOL/L (ref 137–145)
SP GR UR STRIP: 1.02
TOTAL CELLS COUNTED % (AUTO): 100 %
UROBILINOGEN UR-MCNC: NEGATIVE MG/DL (ref ?–2)
WBC # BLD AUTO: 10.1 10^3/UL (ref 4–10.5)

## 2018-05-23 PROCEDURE — 81001 URINALYSIS AUTO W/SCOPE: CPT

## 2018-05-23 PROCEDURE — 36415 COLL VENOUS BLD VENIPUNCTURE: CPT

## 2018-05-23 PROCEDURE — 99284 EMERGENCY DEPT VISIT MOD MDM: CPT

## 2018-05-23 PROCEDURE — 80053 COMPREHEN METABOLIC PANEL: CPT

## 2018-05-23 PROCEDURE — 83690 ASSAY OF LIPASE: CPT

## 2018-05-23 PROCEDURE — 81025 URINE PREGNANCY TEST: CPT

## 2018-05-23 PROCEDURE — 85025 COMPLETE CBC W/AUTO DIFF WBC: CPT

## 2018-05-23 PROCEDURE — 96375 TX/PRO/DX INJ NEW DRUG ADDON: CPT

## 2018-05-23 PROCEDURE — 96361 HYDRATE IV INFUSION ADD-ON: CPT

## 2018-05-23 PROCEDURE — 96374 THER/PROPH/DIAG INJ IV PUSH: CPT

## 2018-05-23 NOTE — ER DOCUMENT REPORT
ED GI/





- General


Chief Complaint: Abdominal Pain


Stated Complaint: ABDOMINAL PAIN


Time Seen by Provider: 18 00:49


Notes: 


Patient is a 27-year-old female who comes emergency department for chief 

complaint of abdominal pains, loose stools, an episode of vomiting, belching, 

and reduced appetite. Pain is in the general mid to upper abdomen today, 

although was mainly lower last time. She states she was seen about a week ago, 

had a CAT scan and a workup that showed a urinary tract infection, she has 

taken antibiotics, she is taking Bentyl for discomfort, she states she feels 

like she is getting worse.  She denies fever, she states she has had episodes 

of bloody stools in the past but none recently, she has also had episodes where 

she accidentally soils herself with stool (although none over the past couple 

of days).  She has had C-sections, she smokes, she denies any other medical 

history, surgeries, or substance use. 





TRAVEL OUTSIDE OF THE [place] IN LAST 30 DAYS: No





- Related Data


Allergies/Adverse Reactions: 


 





latex [Latex] Allergy (Severe, Verified 16 09:34)


 swelling











Past Medical History





- General


Information source: Patient





- Social History


Smoking Status: Never Smoker


Frequency of alcohol use: None


Drug Abuse: None


Lives with: Family


Family History: Reviewed & Not Pertinent





- Past Medical History


Cardiac Medical History: Reports: Hx Heart Murmur


   Denies: Hx Hypertension


Neurological Medical History: Denies: Hx Cerebrovascular Accident, Hx Seizures


Renal/ Medical History: Denies: Hx Peritoneal Dialysis


GI Medical History: Reports: Hx Gastroesophageal Reflux Disease, Hx Ulcer - 

gastric hx of.  Denies: Hx Hiatal Hernia


Psychiatric Medical History: 


Infectious Medical History: Denies: Hx HIV


Past Surgical History: Reports: Hx  Section - x 3, Hx Tubal Ligation





- Immunizations


Immunizations up to date: Yes


Hx Diphtheria, Pertussis, Tetanus Vaccination: Yes - 





Review of Systems





- Review of Systems


Constitutional: No symptoms reported


EENT: No symptoms reported


Cardiovascular: No symptoms reported


Respiratory: No symptoms reported


Gastrointestinal: See HPI


Genitourinary: No symptoms reported


Female Genitourinary: No symptoms reported


Musculoskeletal: No symptoms reported


Skin: No symptoms reported


Hematologic/Lymphatic: No symptoms reported


Neurological/Psychological: No symptoms reported





Physical Exam





- Vital signs


Vitals: 


 











Temp Pulse Resp BP Pulse Ox


 


 97.4 F   71   16   111/51 L  96 


 


 05/23/18 06:15  18 06:15  18 06:15  18 06:15  18 06:15











Interpretation: Normal





- General


General appearance: Appears well


In distress: None





- HEENT


Head: Normocephalic, Atraumatic


Eyes: Normal


Conjunctiva: Normal


Extraocular movements intact: Yes


Eyelashes: Normal


Pupils: PERRL


Mouth/Lips: Normal


Mucous membranes: Dry


Pharynx: Normal


Neck: Normal





- Respiratory


Respiratory status: No respiratory distress


Chest status: Nontender


Breath sounds: Normal


Chest palpation: Normal





- Cardiovascular


Rhythm: Regular.  No: Tachycardia


Heart sounds: Normal auscultation, S1 appreciated, S2 appreciated


Murmur: No


Normal capillary refill: Yes





- Abdominal


Inspection: Normal


Distension: No distension


Bowel sounds: Normal


Tenderness: Tender - generalized mild tenderness; non-specific, no guarding, no 

rigidity





- Back


Back: Normal, Nontender.  No: Tender





- Extremities


General upper extremity: Normal inspection, Nontender, Normal strength, Normal 

temperature


General lower extremity: Normal inspection, Nontender, Normal strength, Normal 

temperature





- Neurological


Neuro grossly intact: Yes


Cognition: Normal


Orientation: AAOx4


[place]ia Coma Scale Eye Opening: Spontaneous


Angelia Coma Scale Verbal: Oriented


Angelia Coma Scale Motor: Obeys Commands


[place] Coma Scale Total: 15


Speech: Normal


Motor strength normal: LUE, RUE, LLE, RLE


Sensory: Normal





- Psychological


Associated symptoms: Normal affect, Normal mood





- Skin


Skin Temperature: Warm


Skin Moisture: Dry


Skin Color: Normal





Course





- Re-evaluation


Re-evalutation: 


Patient vomited after initial PO meds. Still unremarkable exam, placing IV and 

giving IV meds/fluids. 





CBC unremarkable, chemistry unremarkable, urine unremarkable. Patient just had 

a Cat scan which was normal, her examination is benign. Low suspicion of abscess

, perforation, or acute abdomen otherwise. 





Patient reexamined after IV fluids and medications, she is drinking fluids 

without difficulty, she states she does feel better.  Patient was given 

dexamethasone after discussion because of her described symptoms being 

suggestive of possible inflammatory bowel disease.  She will be referred with 

primary care and gastroenterology.  Discussed return precautions in detail, 

patient states understanding and agreement.





- Vital Signs


Vital signs: 


 











Temp Pulse Resp BP Pulse Ox


 


 97.4 F   71   16   111/51 L  96 


 


 18 06:15  18 06:15  18 06:15  18 06:15  18 06:15














- Laboratory


Result Diagrams: 


 18 01:35





 18 01:35


Laboratory results interpreted by me: 


 











  18





  01:35 01:35 01:59


 


Plt Count  114 L  


 


Sodium   145.5 H 


 


Chloride   113 H 


 


Carbon Dioxide   21 L 


 


Creatinine   0.50 L 


 


Ur Leukocyte Esterase    MODERATE H














Discharge





- Discharge


Clinical Impression: 


Vomiting


Qualifiers:


 Vomiting type: unspecified Vomiting Intractability: non-intractable Nausea 

presence: with nausea Qualified Code(s): R11.2 - Nausea with vomiting, 

unspecified





Abdominal pain


Qualifiers:


 Abdominal location: generalized Qualified Code(s): R10.84 - Generalized 

abdominal pain





Condition: Stable


Disposition: HOME, SELF-CARE


Additional Instructions: 


Your symptoms are suggestive of an inflammatory bowel disease but this is not 

certain at this time.  You have been medicated for this, I recommend taking 

your nausea medication if needed, start with bland food and slowly progress.





Follow-up with the primary care referrals ([place] and [facility]).  Follow

-up with the gastroenterology referral as well ([facility]).  Return if you worsen 

including fever, uncontrolled vomiting, severe abdominal pain or swelling, or 

any other concerning symptoms.


Prescriptions: 


Famotidine [Pepcid 20 mg Tablet] 20 mg PO BID PRN #20 tablet


 PRN Reason: 


Metoclopramide HCl [Reglan] 5 mg PO ASDIR PRN #30 tablet


 PRN Reason:

## 2019-01-18 ENCOUNTER — HOSPITAL ENCOUNTER (EMERGENCY)
Dept: HOSPITAL 62 - ER | Age: 29
Discharge: LEFT BEFORE DISCHARGE | End: 2019-01-18
Payer: SELF-PAY

## 2019-01-18 DIAGNOSIS — Z53.21: Primary | ICD-10-CM

## 2019-02-25 ENCOUNTER — HOSPITAL ENCOUNTER (EMERGENCY)
Dept: HOSPITAL 62 - ER | Age: 29
Discharge: LEFT BEFORE DISCHARGE | End: 2019-02-25
Payer: SELF-PAY

## 2019-02-25 VITALS — SYSTOLIC BLOOD PRESSURE: 147 MMHG | DIASTOLIC BLOOD PRESSURE: 86 MMHG

## 2019-02-25 DIAGNOSIS — Z53.21: Primary | ICD-10-CM

## 2019-03-08 ENCOUNTER — HOSPITAL ENCOUNTER (INPATIENT)
Dept: HOSPITAL 62 - ER | Age: 29
LOS: 8 days | Discharge: HOME | DRG: 207 | End: 2019-03-16
Attending: FAMILY MEDICINE | Admitting: FAMILY MEDICINE
Payer: SELF-PAY

## 2019-03-08 DIAGNOSIS — F10.231: ICD-10-CM

## 2019-03-08 DIAGNOSIS — Z82.49: ICD-10-CM

## 2019-03-08 DIAGNOSIS — J96.01: Primary | ICD-10-CM

## 2019-03-08 DIAGNOSIS — G40.801: ICD-10-CM

## 2019-03-08 DIAGNOSIS — Z82.3: ICD-10-CM

## 2019-03-08 DIAGNOSIS — Z80.9: ICD-10-CM

## 2019-03-08 DIAGNOSIS — K21.0: ICD-10-CM

## 2019-03-08 DIAGNOSIS — Z83.3: ICD-10-CM

## 2019-03-08 DIAGNOSIS — F17.210: ICD-10-CM

## 2019-03-08 DIAGNOSIS — Z98.51: ICD-10-CM

## 2019-03-08 DIAGNOSIS — Z91.040: ICD-10-CM

## 2019-03-08 DIAGNOSIS — J69.0: ICD-10-CM

## 2019-03-08 LAB
A TYPE INFLUENZA AG: NEGATIVE
ADD MANUAL DIFF: NO
ALBUMIN SERPL-MCNC: 4.8 G/DL (ref 3.5–5)
ALP SERPL-CCNC: 85 U/L (ref 38–126)
ALT SERPL-CCNC: 20 U/L (ref 9–52)
ANION GAP SERPL CALC-SCNC: 10 MMOL/L (ref 5–19)
APPEARANCE UR: CLEAR
APTT PPP: (no result) S
ARTERIAL BLOOD FIO2: (no result)
ARTERIAL BLOOD H2CO3: 1.12 MMOL/L (ref 1.05–1.35)
ARTERIAL BLOOD HCO3: 21 MMOL/L (ref 20–24)
ARTERIAL BLOOD PCO2: 37.2 MMHG (ref 35–45)
ARTERIAL BLOOD PH: 7.37 (ref 7.35–7.45)
ARTERIAL BLOOD PO2: 119.6 MMHG (ref 80–100)
ARTERIAL BLOOD TOTAL CO2: 22.2 MMOL/L (ref 21–25)
AST SERPL-CCNC: 19 U/L (ref 14–36)
B INFLUENZA AG: NEGATIVE
BARBITURATES UR QL SCN: NEGATIVE
BASE EXCESS BLDA CALC-SCNC: -3.7 MMOL/L
BASE EXCESS BLDV CALC-SCNC: -2.3 MMOL/L
BASOPHILS # BLD AUTO: 0 10^3/UL (ref 0–0.2)
BASOPHILS NFR BLD AUTO: 0.2 % (ref 0–2)
BILIRUB DIRECT SERPL-MCNC: 0.2 MG/DL (ref 0–0.4)
BILIRUB SERPL-MCNC: 0.7 MG/DL (ref 0.2–1.3)
BILIRUB UR QL STRIP: NEGATIVE
BUN SERPL-MCNC: 9 MG/DL (ref 7–20)
CALCIUM: 9.6 MG/DL (ref 8.4–10.2)
CHLORIDE SERPL-SCNC: 107 MMOL/L (ref 98–107)
CK MB SERPL-MCNC: 0.82 NG/ML (ref ?–4.55)
CK MB SERPL-MCNC: < 0.22 NG/ML (ref ?–4.55)
CK MB SERPL-MCNC: < 0.22 NG/ML (ref ?–4.55)
CO2 SERPL-SCNC: 23 MMOL/L (ref 22–30)
EOSINOPHIL # BLD AUTO: 0.4 10^3/UL (ref 0–0.6)
EOSINOPHIL NFR BLD AUTO: 2.7 % (ref 0–6)
ERYTHROCYTE [DISTWIDTH] IN BLOOD BY AUTOMATED COUNT: 13.8 % (ref 11.5–14)
GLUCOSE SERPL-MCNC: 113 MG/DL (ref 75–110)
GLUCOSE UR STRIP-MCNC: NEGATIVE MG/DL
HCO3 BLDV-SCNC: 19.9 MMOL/L (ref 20–32)
HCT VFR BLD CALC: 42.5 % (ref 36–47)
HGB BLD-MCNC: 14.5 G/DL (ref 12–15.5)
KETONES UR STRIP-MCNC: 20 MG/DL
LYMPHOCYTES # BLD AUTO: 1.4 10^3/UL (ref 0.5–4.7)
LYMPHOCYTES NFR BLD AUTO: 9.3 % (ref 13–45)
MCH RBC QN AUTO: 30.3 PG (ref 27–33.4)
MCHC RBC AUTO-ENTMCNC: 34.2 G/DL (ref 32–36)
MCV RBC AUTO: 88 FL (ref 80–97)
METHADONE UR QL SCN: NEGATIVE
MONOCYTES # BLD AUTO: 0.9 10^3/UL (ref 0.1–1.4)
MONOCYTES NFR BLD AUTO: 6.2 % (ref 3–13)
NEUTROPHILS # BLD AUTO: 12.2 10^3/UL (ref 1.7–8.2)
NEUTS SEG NFR BLD AUTO: 81.6 % (ref 42–78)
NITRITE UR QL STRIP: NEGATIVE
PCO2 BLDV: 27.8 MMHG (ref 35–63)
PCP UR QL SCN: NEGATIVE
PH BLDV: 7.47 [PH] (ref 7.3–7.42)
PH UR STRIP: 6 [PH] (ref 5–9)
PLATELET # BLD: 147 10^3/UL (ref 150–450)
POTASSIUM SERPL-SCNC: 3.7 MMOL/L (ref 3.6–5)
PROT SERPL-MCNC: 7.9 G/DL (ref 6.3–8.2)
PROT UR STRIP-MCNC: NEGATIVE MG/DL
RBC # BLD AUTO: 4.81 10^6/UL (ref 3.72–5.28)
SAO2 % BLDA: 98.3 % (ref 94–98)
SODIUM SERPL-SCNC: 140.1 MMOL/L (ref 137–145)
SP GR UR STRIP: 1.06
TOTAL CELLS COUNTED % (AUTO): 100 %
TROPONIN I SERPL-MCNC: < 0.012 NG/ML
URINE AMPHETAMINES SCREEN: NEGATIVE
URINE BENZODIAZEPINES SCREEN: (no result)
URINE COCAINE SCREEN: NEGATIVE
URINE MARIJUANA (THC) SCREEN: (no result)
UROBILINOGEN UR-MCNC: NEGATIVE MG/DL (ref ?–2)
WBC # BLD AUTO: 15 10^3/UL (ref 4–10.5)

## 2019-03-08 PROCEDURE — 94002 VENT MGMT INPAT INIT DAY: CPT

## 2019-03-08 PROCEDURE — 86701 HIV-1ANTIBODY: CPT

## 2019-03-08 PROCEDURE — 81001 URINALYSIS AUTO W/SCOPE: CPT

## 2019-03-08 PROCEDURE — 71275 CT ANGIOGRAPHY CHEST: CPT

## 2019-03-08 PROCEDURE — S0164 INJECTION PANTROPRAZOLE: HCPCS

## 2019-03-08 PROCEDURE — 70450 CT HEAD/BRAIN W/O DYE: CPT

## 2019-03-08 PROCEDURE — 85379 FIBRIN DEGRADATION QUANT: CPT

## 2019-03-08 PROCEDURE — 84481 FREE ASSAY (FT-3): CPT

## 2019-03-08 PROCEDURE — 84703 CHORIONIC GONADOTROPIN ASSAY: CPT

## 2019-03-08 PROCEDURE — 82553 CREATINE MB FRACTION: CPT

## 2019-03-08 PROCEDURE — 94799 UNLISTED PULMONARY SVC/PX: CPT

## 2019-03-08 PROCEDURE — 83735 ASSAY OF MAGNESIUM: CPT

## 2019-03-08 PROCEDURE — 84439 ASSAY OF FREE THYROXINE: CPT

## 2019-03-08 PROCEDURE — 82550 ASSAY OF CK (CPK): CPT

## 2019-03-08 PROCEDURE — 87804 INFLUENZA ASSAY W/OPTIC: CPT

## 2019-03-08 PROCEDURE — 96375 TX/PRO/DX INJ NEW DRUG ADDON: CPT

## 2019-03-08 PROCEDURE — 99291 CRITICAL CARE FIRST HOUR: CPT

## 2019-03-08 PROCEDURE — 81332 SERPINA1 GENE: CPT

## 2019-03-08 PROCEDURE — 84100 ASSAY OF PHOSPHORUS: CPT

## 2019-03-08 PROCEDURE — 94640 AIRWAY INHALATION TREATMENT: CPT

## 2019-03-08 PROCEDURE — 96365 THER/PROPH/DIAG IV INF INIT: CPT

## 2019-03-08 PROCEDURE — 5A1955Z RESPIRATORY VENTILATION, GREATER THAN 96 CONSECUTIVE HOURS: ICD-10-PCS | Performed by: INTERNAL MEDICINE

## 2019-03-08 PROCEDURE — 80307 DRUG TEST PRSMV CHEM ANLYZR: CPT

## 2019-03-08 PROCEDURE — 36415 COLL VENOUS BLD VENIPUNCTURE: CPT

## 2019-03-08 PROCEDURE — 71045 X-RAY EXAM CHEST 1 VIEW: CPT

## 2019-03-08 PROCEDURE — 94660 CPAP INITIATION&MGMT: CPT

## 2019-03-08 PROCEDURE — 80061 LIPID PANEL: CPT

## 2019-03-08 PROCEDURE — 85025 COMPLETE CBC W/AUTO DIFF WBC: CPT

## 2019-03-08 PROCEDURE — 84443 ASSAY THYROID STIM HORMONE: CPT

## 2019-03-08 PROCEDURE — 94003 VENT MGMT INPAT SUBQ DAY: CPT

## 2019-03-08 PROCEDURE — 0BH17EZ INSERTION OF ENDOTRACHEAL AIRWAY INTO TRACHEA, VIA NATURAL OR ARTIFICIAL OPENING: ICD-10-PCS

## 2019-03-08 PROCEDURE — 93010 ELECTROCARDIOGRAM REPORT: CPT

## 2019-03-08 PROCEDURE — 80053 COMPREHEN METABOLIC PANEL: CPT

## 2019-03-08 PROCEDURE — 83880 ASSAY OF NATRIURETIC PEPTIDE: CPT

## 2019-03-08 PROCEDURE — 87040 BLOOD CULTURE FOR BACTERIA: CPT

## 2019-03-08 PROCEDURE — 87070 CULTURE OTHR SPECIMN AEROBIC: CPT

## 2019-03-08 PROCEDURE — 93005 ELECTROCARDIOGRAM TRACING: CPT

## 2019-03-08 PROCEDURE — 82962 GLUCOSE BLOOD TEST: CPT

## 2019-03-08 PROCEDURE — 82803 BLOOD GASES ANY COMBINATION: CPT

## 2019-03-08 PROCEDURE — 87205 SMEAR GRAM STAIN: CPT

## 2019-03-08 PROCEDURE — 84484 ASSAY OF TROPONIN QUANT: CPT

## 2019-03-08 PROCEDURE — 87086 URINE CULTURE/COLONY COUNT: CPT

## 2019-03-08 PROCEDURE — 95819 EEG AWAKE AND ASLEEP: CPT

## 2019-03-08 RX ADMIN — PROPOFOL PRN MLS/HR: 10 INJECTION, EMULSION INTRAVENOUS at 03:30

## 2019-03-08 RX ADMIN — PIPERACILLIN AND TAZOBACTAM SCH MLS/HR: 4; .5 INJECTION, POWDER, LYOPHILIZED, FOR SOLUTION INTRAVENOUS; PARENTERAL at 17:50

## 2019-03-08 RX ADMIN — DEXTROSE, SODIUM CHLORIDE, AND POTASSIUM CHLORIDE PRN MLS/HR: 5; .45; .15 INJECTION INTRAVENOUS at 22:53

## 2019-03-08 RX ADMIN — METHYLPREDNISOLONE SODIUM SUCCINATE SCH MG: 40 INJECTION, POWDER, FOR SOLUTION INTRAMUSCULAR; INTRAVENOUS at 12:41

## 2019-03-08 RX ADMIN — PANTOPRAZOLE SODIUM SCH MG: 40 INJECTION, POWDER, FOR SOLUTION INTRAVENOUS at 22:02

## 2019-03-08 RX ADMIN — Medication SCH: at 15:06

## 2019-03-08 RX ADMIN — PROPOFOL PRN MLS/HR: 10 INJECTION, EMULSION INTRAVENOUS at 11:28

## 2019-03-08 RX ADMIN — MAGNESIUM SULFATE IN DEXTROSE SCH MLS/HR: 10 INJECTION, SOLUTION INTRAVENOUS at 00:54

## 2019-03-08 RX ADMIN — MIDAZOLAM HYDROCHLORIDE PRN MLS/HR: 5 INJECTION, SOLUTION INTRAMUSCULAR; INTRAVENOUS at 21:58

## 2019-03-08 RX ADMIN — IPRATROPIUM BROMIDE SCH MG: 0.5 SOLUTION RESPIRATORY (INHALATION) at 08:47

## 2019-03-08 RX ADMIN — Medication SCH: at 22:03

## 2019-03-08 RX ADMIN — ACETAMINOPHEN PRN MG: 650 SUPPOSITORY RECTAL at 07:47

## 2019-03-08 RX ADMIN — DEXTROSE, SODIUM CHLORIDE, AND POTASSIUM CHLORIDE PRN MLS/HR: 5; .45; .15 INJECTION INTRAVENOUS at 12:45

## 2019-03-08 RX ADMIN — BUDESONIDE SCH MG: 0.5 SUSPENSION RESPIRATORY (INHALATION) at 08:47

## 2019-03-08 RX ADMIN — METHYLPREDNISOLONE SODIUM SUCCINATE SCH MG: 40 INJECTION, POWDER, FOR SOLUTION INTRAMUSCULAR; INTRAVENOUS at 06:36

## 2019-03-08 RX ADMIN — METHYLPREDNISOLONE SODIUM SUCCINATE SCH MG: 40 INJECTION, POWDER, FOR SOLUTION INTRAMUSCULAR; INTRAVENOUS at 17:51

## 2019-03-08 RX ADMIN — FONDAPARINUX SODIUM SCH: 2.5 INJECTION, SOLUTION SUBCUTANEOUS at 09:35

## 2019-03-08 RX ADMIN — LEVALBUTEROL HYDROCHLORIDE SCH MG: 1.25 SOLUTION RESPIRATORY (INHALATION) at 15:58

## 2019-03-08 RX ADMIN — BUDESONIDE SCH MG: 0.5 SUSPENSION RESPIRATORY (INHALATION) at 20:25

## 2019-03-08 RX ADMIN — LEVALBUTEROL HYDROCHLORIDE SCH MG: 1.25 SOLUTION RESPIRATORY (INHALATION) at 08:47

## 2019-03-08 RX ADMIN — PROPOFOL PRN MLS/HR: 10 INJECTION, EMULSION INTRAVENOUS at 16:43

## 2019-03-08 RX ADMIN — PIPERACILLIN AND TAZOBACTAM SCH MLS/HR: 4; .5 INJECTION, POWDER, LYOPHILIZED, FOR SOLUTION INTRAVENOUS; PARENTERAL at 15:05

## 2019-03-08 RX ADMIN — Medication SCH: at 09:35

## 2019-03-08 RX ADMIN — MAGNESIUM SULFATE IN DEXTROSE SCH MLS/HR: 10 INJECTION, SOLUTION INTRAVENOUS at 00:50

## 2019-03-08 RX ADMIN — MIDAZOLAM HYDROCHLORIDE PRN MLS/HR: 5 INJECTION, SOLUTION INTRAMUSCULAR; INTRAVENOUS at 09:31

## 2019-03-08 RX ADMIN — PROPOFOL PRN MLS/HR: 10 INJECTION, EMULSION INTRAVENOUS at 05:57

## 2019-03-08 RX ADMIN — IPRATROPIUM BROMIDE SCH MG: 0.5 SOLUTION RESPIRATORY (INHALATION) at 15:58

## 2019-03-08 RX ADMIN — PANTOPRAZOLE SODIUM SCH MG: 40 INJECTION, POWDER, FOR SOLUTION INTRAVENOUS at 10:51

## 2019-03-08 RX ADMIN — PROPOFOL PRN MLS/HR: 10 INJECTION, EMULSION INTRAVENOUS at 22:52

## 2019-03-08 NOTE — RADIOLOGY REPORT (SQ)
EXAM DESCRIPTION: X-ray single view chest.



CLINICAL HISTORY: 28 years Female, dyspnea



COMPARISON: 10/3/2017



TECHNIQUE: Single portable view of the chest performed on

3/8/2019 at 12:51 AM



FINDINGS: 

The lungs are well expanded and are clear. There is no evidence

of a pneumothorax.



The cardiac silhouette is normal in size and configuration.



The mediastinal contours are normal.



No acute osseous abnormality is identified.



No focal soft tissue abnormalities are seen.



Lines and tubes:  None.



IMPRESSION:

No evidence of acute intrathoracic disease. No significant change

since the prior study.

## 2019-03-08 NOTE — ER DOCUMENT REPORT
ED General





- General


Chief Complaint: Shortness Of Breath


Stated Complaint: ABDOMINAL PAIN


Time Seen by Provider: 19 00:37


Notes: 


Patient is a pleasant 28-year-old female with a history of difficulty breathing.

 Patient says symptoms started earlier today.  She says she felt fine yesterday.

 No fevers.  No vomiting.  Worsening difficulty breathing throughout the day.  

No history of asthma.  She is a smoker.  Patient presents with severe wheezing 

and difficulty breathing.





TRAVEL OUTSIDE OF THE [place] IN LAST 30 DAYS: No





- Related Data


Allergies/Adverse Reactions: 


                                        





latex [Latex] Allergy (Severe, Verified 19 11:01)


   swelling











Past Medical History





- Social History


Smoking Status: Current Every Day Smoker


Frequency of alcohol use: None


Drug Abuse: None


Family History: Reviewed & Not Pertinent





- Past Medical History


Cardiac Medical History: Reports: Hx Heart Murmur


   Denies: Hx Hypertension


Neurological Medical History: Denies: Hx Cerebrovascular Accident, Hx Seizures


Renal/ Medical History: Denies: Hx Peritoneal Dialysis


GI Medical History: Reports: Hx Gastroesophageal Reflux Disease, Hx Ulcer - ga

stric hx of.  Denies: Hx Hiatal Hernia


Psychiatric Medical History: 


Infectious Medical History: Denies: Hx HIV


Past Surgical History: Reports: Hx  Section - x 3, Hx Tubal Ligation





- Immunizations


Immunizations up to date: Yes


Hx Diphtheria, Pertussis, Tetanus Vaccination: Yes - 





Review of Systems





- Review of Systems


Notes: 





My Normal Review Basic





REVIEW OF SYSTEMS:


CONSTITUTIONAL :  Denies fever,  chills, or sweats.  Denies recent illness.


EENT:   Denies eye, ear, throat, or mouth pain or symptoms.  Denies nasal or 

sinus congestion.


RESPIRATORY: Difficulty breathing


GASTROINTESTINAL:  Denies abdominal pain.  Denies nausea, vomiting, or diarrhea.

  


GENITOURINARY:  Denies difficulty urinating, painful urination, burning, 

frequency, or blood in urine.


FEMALE  GENITOURINARY:  Denies vaginal bleeding, abnormal or irregular periods. 


MUSCULOSKELETAL:  Denies neck or back pain or joint pain or swelling.


SKIN:   Denies rash or skin lesions.


NEUROLOGICAL:  Denies altered mental status or loss of consciousness.  Denies 

headache.  Denies weakness or paralysis or loss of use of either side.  Denies p

roblems with gait or speech.  Denies sensory or motor loss.


ALL OTHER SYSTEMS REVIEWED AND NEGATIVE.





Physical Exam





- Vital signs


Vitals: 


                                        











Temp Pulse Resp BP Pulse Ox


 


 99.1 F   94   24 H  135/90 H  89 L


 


 19 00:27  19 00:27  19 00:27  19 00:27  19 00:27














- Notes


Notes: 





General Appearance: Well nourished, alert, cooperative, moderate acute distress,

 no obvious discomfort.


Vitals: reviewed, See vital signs table.


Head: no swelling or tenderness to the head


Eyes: PERRL, EOMI, Conjuctiva clear


Mouth: No decreasd moisture


Throat: No tonsillar inflammation, No airway obstruction,  No lymphadenopathy


Lungs: She is wheezing.  Tachypnea.


Heart: Tachycardic rate, Regular rythm, No murmur, no rub


Abdomen: Normal BS, soft, No rigidity, No abdominal tenderness, No guarding, no 

rebound,


Extremities: strength 5/5 in all extremities, good pulses in all extremities, no

 swelling or tenderness in the extremities, no edema.


Skin: warm, dry, appropriate color, no rash


Neuro: speech clear, oriented x 3, normal affect, responds appropriately to 

questions.





Course





- Re-evaluation


Re-evalutation: 





19 01:18


Patient's wheezing is improving but her tachypnea continues and she continues to

 take.  Her movement is improving.  I will reassess again in 15 minutes to 

determine whether not further breathing treatments will be of help at this time.


19 01:40








On reevaluation patient still has some tachypnea.  She still looks short of 

breath.  I will place her on BiPAP to try to give her some rest to try to help 

with her breathing to see if this shores up her tachypnea.  She still does not 

have a lot of wheezing since last breathing treatment; however, she is obviously

 some distress taking quick short breaths.  Once her labs are come back we will 

have to consider possibility of CTA to rule out PE.  I did ask the patient again

 about IV drug abuse.  Patient absolutely denies any IV drug history and her 

significant other is at bedside and also denies ever having a history of IV drug

 use.


19 02:01








After BiPAP patient still tachypneic.  Lung fields are clear.  I have ordered a 

CTA to investigate for possible PE.  I will give her a small nondissociative 

dose of ketamine to see if this does not help calm down her respiratory rate.


19 03:08








Despite the ketamine the patient still continues to have significant tachypnea 

continues to have increased work of breathing with retractions.  Her lung fields

 have just slight wheezing but otherwise she has decent air movement.  She did 

not do well with the BiPAP.  This did not help her breathing.  I still do not 

know the exact cause of why she is having such a hard time breathing at this 

moment.  She does complain that she is get very fatigued.  On exam she looks 

very fatigued and I do not think she can continue much longer without continue 

respiratory support.  I talked her about placing her on a ventilator.  I ex

plained to her what it would entail to place her on a ventilator.  She is 

agreeable to this.  I feel that without doing this she would eventually 

developed respiratory arrest do to her significant fatigue.  I also called her 

fianc, Darinel, discussed this with him and he is agreeable to it.  Patient says 

she has no one else family wise for me to call.


19 03:26





19 03:28


Patient is now intubated on the ventilator.  Placed respiratory rate 20 being 

that she was very tachypneic before being placed on a ventilator.  I will 

shortly attain an ABG to see what her blood gas is and adjust the ventilator 

accordingly.


19 04:52





Patient is doing well on the ventilator.  Her post ventilator ABG is normal-

appearing.  She is now doing well in the sedation.  She initially she would not 

stay sedated with propofol on.  We did add Versed which has helped 

significantly.  I am still not sure the exact cause of her respiratory distress.

  She did have a lot of wheezing on initial arrival.  Her wheezing did respond 

well to the DuoNeb treatment.  She is continued have some recurrent wheezing 

here and there however she had good air movement.  Unfortunately she continued 

to have significant tachypnea with a respiratory rate being in the 40s and 50s. 

 Because of this she continued to become more tired to the point where I felt 

that I need to intubate her as she was exhausted and I did not think she could 

breathe on her own for much longer.  She is now resting comfortably in the vent.

  I did obtain a CTA which did not show evidence of PE.  It does show some 

pneumonitis.  She currently does not meet ARDS criteria.  Her FiO2 to PO2 ratio 

is normal. Her flu swab is negative.  I have spoken with the hospitalist, Dr. Weiland, who agrees to come evaluate the patient.





Dictation of this chart was performed using voice recognition software; 

therefore, there may be some unintended grammatical errors.





- Vital Signs


Vital signs: 


                                        











Temp Pulse Resp BP Pulse Ox


 


 98.9 F   94   20   127/75 H  97 


 


 19 03:15  19 00:27  19 04:46  19 04:46  19 04:46














- Laboratory


Result Diagrams: 


                                 19 00:39





                                 19 00:39


Laboratory results interpreted by me: 


                                        











  19





  00:39 00:39 00:39


 


WBC  15.0 H  


 


Plt Count  147 L  


 


Seg Neutrophils %  81.6 H  


 


Lymphocytes %  9.3 L  


 


Absolute Neutrophils  12.2 H  


 


ABG pO2   


 


ABG O2 Saturation   


 


VBG pH    7.47 H


 


VBG pCO2    27.8 L


 


VBG HCO3    19.9 L


 


Creatinine   0.43 L 


 


Glucose   113 H 


 


Salicylates   














  19





  00:39 03:50


 


WBC  


 


Plt Count  


 


Seg Neutrophils %  


 


Lymphocytes %  


 


Absolute Neutrophils  


 


ABG pO2   119.6 H


 


ABG O2 Saturation   98.3 H


 


VBG pH  


 


VBG pCO2  


 


VBG HCO3  


 


Creatinine  


 


Glucose  


 


Salicylates  < 1.0 L 














- EKG Interpretation by Me


Additional EKG results interpreted by me: 





19 01:16


EKG is reviewed and interpreted by me.  EKG shows sinus tachycardia with a rate 

of 105 bpm.  No ST segment elevation or depression.  No ischemic T wave 

inversions.  WY interval slightly prolonged at 216 ms.  QRS duration and QTC 

intervals are within normal range.  Old EKG for comparison is from October 3, 

2017.





Procedures





- Intubation


  ** Orotracheal


Airway evaluation: Normal anatomy


Mallampati Classification: Class 1


Intubation method: Orotracheal


Blade type: Robyn


Blade size: 3


Equipment used: Glidescope


ETT size: 7.5


Breath Sounds after Intubation: Equal


End tidal CO2 confirmed: Yes


Post Intubation Xray: Yes - appropriate positioining


Intubation Complications: No complications





Critical Care Note





- Critical Care Note


Total time excluding time spent on procedures (mins): 60


Comments: 





Critical care time for the patient including time spent in procedures 

approximately 60 minutes due to frequent reevaluation due to respiratory distres

s and continue respiratory decompensation.  Also management of sedation and 

ventilator.





Discharge





- Discharge


Clinical Impression: 


 Wheezing





Respiratory failure


Qualifiers:


 Chronicity: acute Respiratory failure complication: hypoxia Qualified Code(s): 

J96.01 - Acute respiratory failure with hypoxia





Condition: Stable


Disposition: ADMITTED AS INPATIENT


Admitting Provider: Hospitalist


Unit Admitted: ICU

## 2019-03-08 NOTE — RADIOLOGY REPORT (SQ)
EXAM DESCRIPTION:  CHEST SINGLE VIEW



COMPLETED DATE/TIME:  3/8/2019 11:24 am



REASON FOR STUDY:  Follow-up hypoxia



COMPARISON:  CT chest 3/8/2019

Chest films 3/8/2019, 10/3/2017



EXAM PARAMETERS:  NUMBER OF VIEWS: One view.

TECHNIQUE: Single frontal radiographic view of the chest acquired.

RADIATION DOSE: NA

LIMITATIONS: None.



FINDINGS:  LUNGS AND PLEURA: Minimal left retrocardiac atelectasis.  Lungs otherwise well inflated an
d clear.

No pleural effusion.  No pneumothorax.

MEDIASTINUM AND HILAR STRUCTURES: No masses.  Contour normal.

HEART AND VASCULAR STRUCTURES: Heart normal in size.  Normal vasculature.

BONES: No acute findings.

HARDWARE: Endotracheal tube tip midtrachea.  Nasogastric tube tip and side port below the hemidiaphra
gms

OTHER: No other significant finding.



IMPRESSION:  Endotracheal tube, nasogastric tube in good positioning.

Minimal left retrocardiac atelectasis.



TECHNICAL DOCUMENTATION:  JOB ID:  [id number]

 2011 [employer]- All Rights Reserved



Reading location - IP/workstation name: [email or web address]

## 2019-03-08 NOTE — EKG REPORT
SEVERITY:- ABNORMAL ECG -

SINUS TACHYCARDIA

FIRST DEGREE AV BLOCK

PROBABLE LEFT ATRIAL ABNORMALITY

BORDERLINE T ABNORMALITIES, ANT-LAT LEADS

:

Confirmed by: Jade Roberts MD 08-Mar-2019 07:32:16

## 2019-03-08 NOTE — RADIOLOGY REPORT (SQ)
EXAM DESCRIPTION: 



XR CHEST 1 VIEW



COMPLETED DATE/TME:  03/08/2019 04:02



CLINICAL HISTORY: 



28 years, Female, post intubation



COMPARISON:

Prior chest x-ray from today's date



NUMBER OF VIEWS:

1



TECHNIQUE:

Portable chest



LIMITATIONS:

None.



FINDINGS:



The endotracheal tube has been retracted and now lies

approximately 3 cm above the tez. Enteric tube with the distal

tip in the left upper quadrant. No pneumothorax. Lungs are clear.

Heart size is normal



IMPRESSION:



Endotracheal tube and enteric tubes in place. No pneumothorax

 



copyright 2011 [employer]- All Rights Reserved

## 2019-03-08 NOTE — PROGRESS NOTE E
Progress Note



NAME: NIKOS FERGUSON

MRN: [id number]

: 1990             AGE: 28Y

DATE:  2019           ROOM: 611

 

SUBJECTIVE:

The patient is on vent.  The patient remains intubated and profoundly 

sedated.  The patient apparently has had numerous sick contacts in the 

last couple of days according to her significant other, as their children have 

been sick.



The patient is oxygenating well on 40% FiO2.  Blood pressure has been in a

good range.  Has not spiked any fevers.  The patient is unable to voice

any concerns at this time.



I was able to discuss the patient's care with her significant other.  He does

Endorse significant alcohol consumption.



REVIEW OF SYSTEMS:

Unobtainable.



MEDICATIONS:

Reviewed.



OBJECTIVE:

GENERAL:  The patient is a 28-year-old  female who is currently

intubated, sedated, very difficult to arouse.  She does not appear to be

distressed.



VITAL SIGNS:  Temperature is 98.1, pulse 96, respirations 20, blood

pressure is 138/87, oxygen saturation is 93% on 40% FiO2.



SKIN:  Warm and dry.  No rash.  She is not diaphoretic.



HEENT:  Pupils are reactive.  Conjunctivae pink.  ET tube is in place.



CARDIOVASCULAR:  Heart is regular.  No rub.



CHEST:  Diminished, symmetrical, unlabored.



ABDOMEN: Soft, nontender.



EXTREMITIES:  No clubbing, cyanosis, or edema.



PSYCHIATRIC:  Unable to assess.





DIAGNOSTICS:

Lab values are as follows - hematology obtained on 2019; WBC is

15.0, hemoglobin is 14.5, hematocrit is 42.5, platelet count is 147,000.



Chemistry obtained on 2019; CK-MB is 0.82, troponin is 0.012.



Blood cultures obtained on 2019 are pending.



Urine culture obtained on 2019 is pending.



IMPRESSION AND PLAN:

1.  BILATERAL PNEUMONITIS, POSSIBLY VIRAL ETIOLOGY.  Regardless will start

the patient on broad spectrum antibiotic coverage given her illness and

will follow.

2.  ACUTE HYPOXEMIC RESPIRATORY FAILURE.  The patient is mechanically

intubated.  Will repeat chest x-ray in the a.m.  Do appreciate pulmonology

support with this.  The patient's presentation is interesting given that

she does not have a history of asthma and so forth.  Will monitor closely.



CODE STATUS:

The patient is a full code.



DISPOSITION:

Depending on the patient's symptomatology and diagnostic findings will

reevaluate in the a.m.



TIME SPENT:

On this critical care visit, including assessment and plan, physical

examination, and attend to patient education family meeting is 35 minutes.



DICTATING PHYSICIAN:  JONG YEPEZ NP





[id number]                  DT: 2019    [id number]

PHY#: [id number]            DD: 2019    1829

ID:   [id number]           JOB#: [id number]       ACCT: [id number]



cc:

>







MTDD

## 2019-03-08 NOTE — RADIOLOGY REPORT (SQ)
EXAM DESCRIPTION: 



CT CHEST ANGIOGRAPHY WITHOUT THEN WITH IV CONTRAST



COMPLETED DATE/TME:  03/08/2019 01:41



CLINICAL HISTORY: 



28 years, Female, dyspnea. HCG NEG



COMPARISON:

10/3/2017 CTA



TECHNIQUE:

509  Images stored on PACS.

 

All CT scanners at this facility use dose modulation, iterative

reconstruction, and/or weight based dosing when appropriate to

reduce radiation dose to as low as reasonably achievable (ALARA).

Axial CTA images were obtained with coronal and sagittal MIPS

reconstructions 



[facility]: Dose Right [facility]: CareDose   [facility]: Dose Right    [facility]:

Teradose 4D    [facility]: [employer] Technologies



LIMITATIONS:

None.



FINDINGS:



The mediastinal vasculature enhances normally. No intraluminal

filling defect to suggest pulmonary embolus. Negative for

thoracic aortic aneurysm or dissection. No mediastinal or hilar

adenopathy. The heart and pericardium are unremarkable. No

pneumothorax. Visualized airways are patent. Patchy groundglass

opacities in the perihilar regions bilaterally consistent with

bilateral pneumonitis.





IMPRESSION:



Negative for pulmonary embolus.



Perihilar groundglass opacities bilaterally consistent with

pneumonitis

 

TECHNICAL DOCUMENTATION:



Quality ID # [id number]: Final reports with documentation of one or more

dose reduction techniques (e.g., Automated exposure control,

adjustment of the mA and/or kV according to patient size, use of

iterative reconstruction technique)



copyright 2011 [employer]- All Rights Reserved

## 2019-03-08 NOTE — PDOC H&P
History of Present Illness


Admission Date/PCP: 


  19 05:05





  





Patient complains of: Dyspnea


History of Present Illness: 


NIKOS FERGUSON is a 28 year old female who presented to the emergency room 

with acute dyspnea.  Patient related that she developed progressive dyspnea 

during the day prior to her presentation and her symptoms increased to the point

where she felt that her dyspnea was severe and presented to the emergency room. 

Her symptoms were accompanied by a nonproductive cough and progressively 

worsening wheezing.  She denied prior similar episodes and had not identified 

any aggravating or ameliorating factors for her dyspnea and wheezing.  In the 

emergency room she was found to be tachypneic and hypoxic with wheezing.  She 

was treated with intravenous steroids as well as nebulizer treatments without 

improvement.  She was subsequently placed on BiPAP also without improvement.  

She was given a dissociative dose of ketamine again without any improvement in 

her respiratory status.  Her condition progressively declined to the point where

she required intubation due to acute respiratory failure with hypoxia and 

physical exhaustion due to the progressively increased work of breathing.  She 

was subsequently admitted in the ICU for further evaluation and treatment.





Past Medical History


Cardiac Medical History: Reports: Heart Murmur


   Denies: Coronary Artery Disease, Hypertension


Pulmonary Medical History: 


   Denies: Asthma, Chronic Obstructive Pulmonary Disease (COPD)


EENT Medical History: Reports: None


Neurological Medical History: 


   Denies: Multiple Sclerosis, Seizures


Endocrine Medical History: 


   Denies: Diabetes Mellitus Type 1, Diabetes Mellitus Type 2


Renal/ Medical History: 


   Denies: Chronic Kidney Disease, Nephrolithiasis


Malignancy Medical History: Reports: None


GI Medical History: Reports: Gastroesophageal Reflux Disease


   Denies: Hiatal Hernia


Musculoskeltal Medical History: 


   Denies: Arthritis


Skin Medical History: 


   Denies: Eczema, Psoriasis


Psychiatric Medical History: Reports: Tobacco Dependency


   Denies: Alcohol Dependency, Substance Abuse


Traumatic Medical History: Reports: None


Hematology: 


   Denies: Anemia, Bleeding Tendencies


Infectious Medical History: Reports: None





Past Surgical History


Past Surgical History: Reports:  Section - x 3, Tubal Ligation





Social History


Information Source: Patient - History given prior to her intubation


Lives with: Family, Spouse/Significant other - Significant other Darinel


Smoking Status: Current Every Day Smoker


Frequency of Alcohol Use: None


Hx Recreational Drug Use: No


Drugs: None


Hx Prescription Drug Abuse: No





- Advance Directive


Resuscitation Status: Full Code


Surrogate healthcare decision maker:: 


Darinel her significant other





Family History


Family History: CAD, CVA, DM, Hypertension, Malignancy


Parental Family History Reviewed: Yes


Children Family History Reviewed: No


Sibling(s) Family History Reviewed.: Yes





Medication/Allergy


Home Medications: 








Cephalexin Monohydrate [Keflex 500 mg Capsule] 500 mg PO BID 7 Days #14 capsule 

18 


Dicyclomine HCl [Bentyl 20 mg Tablet] 20 mg PO BID #20 tablet 18 


Famotidine [Pepcid 20 mg Tablet] 20 mg PO BID PRN #20 tablet 18 


Metoclopramide HCl [Reglan] 5 mg PO ASDIR PRN #30 tablet 18 








Allergies/Adverse Reactions: 


                                        





latex [Latex] Allergy (Severe, Verified 19 11:01)


   swelling











Review of Systems


ROS unobtainable: Due to endotracheal tube





Physical Exam


Vital Signs: 


                                        











Temp Pulse Resp BP Pulse Ox


 


 98.9 F   94   20   138/81 H  96 


 


 19 03:15  19 00:27  19 05:01  19 05:01  19 05:01








                                 Intake & Output











 19





 23:59 23:59 23:59


 


Intake Total   107


 


Balance   107


 


Weight   60.4 kg











General appearance: PRESENT: no acute distress, well-developed, other - 

Intubated and on ventilator at the time of my evaluation


Head exam: PRESENT: atraumatic, normocephalic


Eye exam: PRESENT: conjunctiva pink.  ABSENT: scleral icterus


Ear exam: PRESENT: normal external ear exam.  ABSENT: bleeding, drainage


Mouth exam: PRESENT: dry mucosa, neck supple


Neck exam: ABSENT: thyromegaly, tracheal deviation


Respiratory exam: PRESENT: symmetrical, other - On ventilator at the time my 

evaluation


Cardiovascular exam: PRESENT: RRR, tachycardia.  ABSENT: clicks, gallop, rubs


Pulses: PRESENT: normal radial pulses, normal dorsalis pedis pul


Vascular exam: PRESENT: normal capillary refill.  ABSENT: pallor


GI/Abdominal exam: PRESENT: normal bowel sounds, soft


Rectal exam: PRESENT: deferred


Extremities exam: ABSENT: joint swelling, pedal edema


Musculoskeletal exam: ABSENT: deformity, dislocation


Neurological exam: PRESENT: other - Sedated and ventilated at the time my 

evaluation


Psychiatric exam: PRESENT: other - Sedated and ventilated at the time of my 

evaluation


Skin exam: PRESENT: dry, intact, warm.  ABSENT: jaundice, rash, urticaria





Results


Laboratory Results: 


                                        





                                 19 00:39 





                                 19 00:39 





                                        











  19





  00:39 00:39 00:39


 


WBC  15.0 H  


 


RBC  4.81  


 


Hgb  14.5  


 


Hct  42.5  


 


MCV  88  


 


MCH  30.3  


 


MCHC  34.2  


 


RDW  13.8  


 


Plt Count  147 L  


 


Seg Neutrophils %  81.6 H  


 


Lymphocytes %  9.3 L  


 


Monocytes %  6.2  


 


Eosinophils %  2.7  


 


Basophils %  0.2  


 


Absolute Neutrophils  12.2 H  


 


Absolute Lymphocytes  1.4  


 


Absolute Monocytes  0.9  


 


Absolute Eosinophils  0.4  


 


Absolute Basophils  0.0  


 


Carbonic Acid   


 


HCO3/H2CO3 Ratio   


 


ABG pH   


 


ABG pCO2   


 


ABG pO2   


 


ABG HCO3   


 


ABG O2 Saturation   


 


ABG Base Excess   


 


VBG pH    7.47 H


 


VBG pCO2    27.8 L


 


VBG HCO3    19.9 L


 


VBG Base Excess    -2.3


 


FiO2   


 


Sodium   140.1 


 


Potassium   3.7 


 


Chloride   107 


 


Carbon Dioxide   23 


 


Anion Gap   10 


 


BUN   9 


 


Creatinine   0.43 L 


 


Est GFR ( Amer)   > 60 


 


Est GFR (Non-Af Amer)   > 60 


 


Glucose   113 H 


 


Calcium   9.6 


 


Total Bilirubin   0.7 


 


AST   19 


 


ALT   20 


 


Alkaline Phosphatase   85 


 


Total Protein   7.9 


 


Albumin   4.8 


 


Serum HCG, Qual   


 


Urine Color   


 


Urine Appearance   


 


Urine pH   


 


Ur Specific Gravity   


 


Urine Protein   


 


Urine Glucose (UA)   


 


Urine Ketones   


 


Urine Blood   


 


Urine Nitrite   


 


Ur Leukocyte Esterase   


 


Urine WBC (Auto)   


 


Urine RBC (Auto)   














  19





  00:39 03:45 03:50


 


WBC   


 


RBC   


 


Hgb   


 


Hct   


 


MCV   


 


MCH   


 


MCHC   


 


RDW   


 


Plt Count   


 


Seg Neutrophils %   


 


Lymphocytes %   


 


Monocytes %   


 


Eosinophils %   


 


Basophils %   


 


Absolute Neutrophils   


 


Absolute Lymphocytes   


 


Absolute Monocytes   


 


Absolute Eosinophils   


 


Absolute Basophils   


 


Carbonic Acid    1.12


 


HCO3/H2CO3 Ratio    18:1


 


ABG pH    7.37


 


ABG pCO2    37.2


 


ABG pO2    119.6 H


 


ABG HCO3    21.0


 


ABG O2 Saturation    98.3 H


 


ABG Base Excess    -3.7


 


VBG pH   


 


VBG pCO2   


 


VBG HCO3   


 


VBG Base Excess   


 


FiO2    50%


 


Sodium   


 


Potassium   


 


Chloride   


 


Carbon Dioxide   


 


Anion Gap   


 


BUN   


 


Creatinine   


 


Est GFR ( Amer)   


 


Est GFR (Non-Af Amer)   


 


Glucose   


 


Calcium   


 


Total Bilirubin   


 


AST   


 


ALT   


 


Alkaline Phosphatase   


 


Total Protein   


 


Albumin   


 


Serum HCG, Qual  NEGATIVE  


 


Urine Color   STRAW 


 


Urine Appearance   CLEAR 


 


Urine pH   6.0 


 


Ur Specific Gravity   1.059 


 


Urine Protein   NEGATIVE 


 


Urine Glucose (UA)   NEGATIVE 


 


Urine Ketones   20 H 


 


Urine Blood   SMALL H 


 


Urine Nitrite   NEGATIVE 


 


Ur Leukocyte Esterase   NEGATIVE 


 


Urine WBC (Auto)   1 


 


Urine RBC (Auto)   1 











Impressions: 


                                        





Chest/Abdomen CTA  19 01:41


IMPRESSION:


 


Negative for pulmonary embolus.


 


Perihilar groundglass opacities bilaterally consistent with


pneumonitis


 


TECHNICAL DOCUMENTATION:


 


Quality ID # 436: Final reports with documentation of one or more


dose reduction techniques (e.g., Automated exposure control,


adjustment of the mA and/or kV according to patient size, use of


iterative reconstruction technique)


 


copyright  [employer]- All Rights Reserved


 








Chest X-Ray  19 04:02


IMPRESSION:


 


Endotracheal tube and enteric tubes in place. No pneumothorax


 


 


copyright  [employer]- All Rights Reserved


 














Assessment & Plan





- Diagnosis


(1) Acute respiratory failure with hypoxia


Is this a current diagnosis for this admission?: Yes   


Plan: 


Patient is intubated and ventilated at the present time Dr. Luna will be c

onsulted for assistance in ventilatory management and for a pulmonology consult 

help to determine the cause of the patient's respiratory compromise.








(2) Dyspnea and respiratory abnormalities


Is this a current diagnosis for this admission?: Yes   


Plan: 


Patient will be treated with a pulmonary toilet utilizing Pulmicort, Xopenex, 

Atrovent and albuterol.  She will receive Solu-Medrol IV.  She will also receive

 usual supportive and symptomatic cares during her hospital course once she is 

off the ventilator.








(3) Tobacco dependence


Is this a current diagnosis for this admission?: Yes   


Plan: 


Patient will be counseled on tobacco use cessation when she is able to be off 

the ventilator and understand the discussion and participating fully.








(4) Gastro-esophageal reflux disease with esophagitis


Is this a current diagnosis for this admission?: Yes   


Plan: 


Patient will be maintained on Prilosec 40 mg IV every 12 hours to protect her GI

 tract.








- Time


Time Spent: 30 to 50 Minutes


Critical Time spent with patient: Less than 15 minutes


Medications reviewed and adjusted accordingly: Yes


Anticipated discharge: Home





- Inpatient Certification


Based on my medical assessment, after consideration of the patient's 

comorbidities, presenting symptoms, or acuity I expect that the services needed 

warrant INPATIENT care.: Yes


I certify that my determination is in accordance with my understanding of 

Medicare's requirements for reasonable and necessary INPATIENT services [42 CFR 

412.3e].: Yes


Medical Necessity: Need Close Monitoring Due to Risk of Patient Decompensation, 

Need For IV Fluids, Need For Continuous Telemetry Monitoring, Need for Nebulizer

Therapy and Monitoring of Response, Risk of Complication if Not Cared For in 

Hospital, Risk of Diagnosis Which Will Require Inpatient Eval/Care/Monitoring

## 2019-03-09 LAB
ABSOLUTE LYMPHOCYTES# (MANUAL): 0.5 10^3/UL (ref 0.5–4.7)
ABSOLUTE MONOCYTES # (MANUAL): 0.3 10^3/UL (ref 0.1–1.4)
ABSOLUTE NEUTROPHILS# (MANUAL): 12 10^3/UL (ref 1.7–8.2)
ADD MANUAL DIFF: YES
ALBUMIN SERPL-MCNC: 3.5 G/DL (ref 3.5–5)
ALP SERPL-CCNC: 62 U/L (ref 38–126)
ALT SERPL-CCNC: 16 U/L (ref 9–52)
ANION GAP SERPL CALC-SCNC: 9 MMOL/L (ref 5–19)
ARTERIAL BLOOD FIO2: (no result)
ARTERIAL BLOOD H2CO3: 1.01 MMOL/L (ref 1.05–1.35)
ARTERIAL BLOOD HCO3: 21.6 MMOL/L (ref 20–24)
ARTERIAL BLOOD PCO2: 33.7 MMHG (ref 35–45)
ARTERIAL BLOOD PH: 7.43 (ref 7.35–7.45)
ARTERIAL BLOOD PO2: 69.6 MMHG (ref 80–100)
ARTERIAL BLOOD TOTAL CO2: 22.7 MMOL/L (ref 21–25)
AST SERPL-CCNC: 8 U/L (ref 14–36)
BASE EXCESS BLDA CALC-SCNC: -2.1 MMOL/L
BASOPHILS NFR BLD MANUAL: 0 % (ref 0–2)
BILIRUB DIRECT SERPL-MCNC: 0.1 MG/DL (ref 0–0.4)
BILIRUB SERPL-MCNC: 0.4 MG/DL (ref 0.2–1.3)
BUN SERPL-MCNC: 7 MG/DL (ref 7–20)
CALCIUM: 8.7 MG/DL (ref 8.4–10.2)
CHLORIDE SERPL-SCNC: 111 MMOL/L (ref 98–107)
CHOLEST SERPL-MCNC: 106.34 MG/DL (ref 0–200)
CO2 SERPL-SCNC: 21 MMOL/L (ref 22–30)
EOSINOPHIL NFR BLD MANUAL: 0 % (ref 0–6)
ERYTHROCYTE [DISTWIDTH] IN BLOOD BY AUTOMATED COUNT: 13.9 % (ref 11.5–14)
FREE T4 (FREE THYROXINE): 1.46 NG/DL (ref 0.78–2.19)
GLUCOSE SERPL-MCNC: 210 MG/DL (ref 75–110)
HCT VFR BLD CALC: 35.7 % (ref 36–47)
HGB BLD-MCNC: 11.9 G/DL (ref 12–15.5)
LDLC SERPL DIRECT ASSAY-MCNC: 50 MG/DL (ref ?–100)
MCH RBC QN AUTO: 29.8 PG (ref 27–33.4)
MCHC RBC AUTO-ENTMCNC: 33.3 G/DL (ref 32–36)
MCV RBC AUTO: 90 FL (ref 80–97)
MONOCYTES % (MANUAL): 2 % (ref 3–13)
PLATELET # BLD: 101 10^3/UL (ref 150–450)
PLATELET COMMENT: ADEQUATE
POTASSIUM SERPL-SCNC: 3.1 MMOL/L (ref 3.6–5)
PROT SERPL-MCNC: 6 G/DL (ref 6.3–8.2)
RBC # BLD AUTO: 3.99 10^6/UL (ref 3.72–5.28)
RBC MORPH BLD: (no result)
SAO2 % BLDA: 94.5 % (ref 94–98)
SEGMENTED NEUTROPHILS % (MAN): 94 % (ref 42–78)
SODIUM SERPL-SCNC: 140.7 MMOL/L (ref 137–145)
T3FREE SERPL-MCNC: 3.27 PG/ML (ref 2.77–5.27)
TOTAL CELLS COUNTED BLD: 100
TRIGL SERPL-MCNC: 71 MG/DL (ref ?–150)
TSH SERPL-ACNC: 0.13 UIU/ML (ref 0.47–4.68)
VARIANT LYMPHS NFR BLD MANUAL: 4 % (ref 13–45)
VLDLC SERPL CALC-MCNC: 14 MG/DL (ref 10–31)
WBC # BLD AUTO: 12.8 10^3/UL (ref 4–10.5)

## 2019-03-09 RX ADMIN — MIDAZOLAM HYDROCHLORIDE PRN MLS/HR: 5 INJECTION, SOLUTION INTRAMUSCULAR; INTRAVENOUS at 14:44

## 2019-03-09 RX ADMIN — IPRATROPIUM BROMIDE SCH MG: 0.5 SOLUTION RESPIRATORY (INHALATION) at 08:33

## 2019-03-09 RX ADMIN — PIPERACILLIN AND TAZOBACTAM SCH MLS/HR: 4; .5 INJECTION, POWDER, LYOPHILIZED, FOR SOLUTION INTRAVENOUS; PARENTERAL at 00:38

## 2019-03-09 RX ADMIN — PROPOFOL PRN MLS/HR: 10 INJECTION, EMULSION INTRAVENOUS at 13:46

## 2019-03-09 RX ADMIN — METHYLPREDNISOLONE SODIUM SUCCINATE SCH MG: 40 INJECTION, POWDER, FOR SOLUTION INTRAMUSCULAR; INTRAVENOUS at 05:14

## 2019-03-09 RX ADMIN — PROPOFOL PRN MLS/HR: 10 INJECTION, EMULSION INTRAVENOUS at 23:31

## 2019-03-09 RX ADMIN — PROPOFOL PRN MLS/HR: 10 INJECTION, EMULSION INTRAVENOUS at 18:29

## 2019-03-09 RX ADMIN — Medication SCH ML: at 14:25

## 2019-03-09 RX ADMIN — PANTOPRAZOLE SODIUM SCH MG: 40 INJECTION, POWDER, FOR SOLUTION INTRAVENOUS at 21:38

## 2019-03-09 RX ADMIN — IPRATROPIUM BROMIDE SCH MG: 0.5 SOLUTION RESPIRATORY (INHALATION) at 01:54

## 2019-03-09 RX ADMIN — PANTOPRAZOLE SODIUM SCH MG: 40 INJECTION, POWDER, FOR SOLUTION INTRAVENOUS at 09:49

## 2019-03-09 RX ADMIN — METHYLPREDNISOLONE SODIUM SUCCINATE SCH MG: 40 INJECTION, POWDER, FOR SOLUTION INTRAMUSCULAR; INTRAVENOUS at 00:37

## 2019-03-09 RX ADMIN — IPRATROPIUM BROMIDE SCH MG: 0.5 SOLUTION RESPIRATORY (INHALATION) at 16:05

## 2019-03-09 RX ADMIN — PIPERACILLIN AND TAZOBACTAM SCH MLS/HR: 4; .5 INJECTION, POWDER, LYOPHILIZED, FOR SOLUTION INTRAVENOUS; PARENTERAL at 05:14

## 2019-03-09 RX ADMIN — DEXTROSE, SODIUM CHLORIDE, AND POTASSIUM CHLORIDE PRN MLS/HR: 5; .45; .15 INJECTION INTRAVENOUS at 15:43

## 2019-03-09 RX ADMIN — METHYLPREDNISOLONE SODIUM SUCCINATE SCH MG: 40 INJECTION, POWDER, FOR SOLUTION INTRAMUSCULAR; INTRAVENOUS at 17:07

## 2019-03-09 RX ADMIN — LEVALBUTEROL HYDROCHLORIDE SCH MG: 1.25 SOLUTION RESPIRATORY (INHALATION) at 08:33

## 2019-03-09 RX ADMIN — Medication SCH: at 21:38

## 2019-03-09 RX ADMIN — PROPOFOL PRN MLS/HR: 10 INJECTION, EMULSION INTRAVENOUS at 08:36

## 2019-03-09 RX ADMIN — DEXTROSE, SODIUM CHLORIDE, AND POTASSIUM CHLORIDE PRN MLS/HR: 5; .45; .15 INJECTION INTRAVENOUS at 07:00

## 2019-03-09 RX ADMIN — FONDAPARINUX SODIUM SCH MG: 2.5 INJECTION, SOLUTION SUBCUTANEOUS at 09:48

## 2019-03-09 RX ADMIN — MIDAZOLAM HYDROCHLORIDE PRN MLS/HR: 5 INJECTION, SOLUTION INTRAMUSCULAR; INTRAVENOUS at 21:37

## 2019-03-09 RX ADMIN — LEVALBUTEROL HYDROCHLORIDE SCH MG: 1.25 SOLUTION RESPIRATORY (INHALATION) at 01:54

## 2019-03-09 RX ADMIN — POTASSIUM CHLORIDE SCH MLS/HR: 29.8 INJECTION, SOLUTION INTRAVENOUS at 09:50

## 2019-03-09 RX ADMIN — MIDAZOLAM HYDROCHLORIDE PRN MLS/HR: 5 INJECTION, SOLUTION INTRAMUSCULAR; INTRAVENOUS at 06:59

## 2019-03-09 RX ADMIN — Medication SCH ML: at 05:14

## 2019-03-09 RX ADMIN — METHYLPREDNISOLONE SODIUM SUCCINATE SCH MG: 40 INJECTION, POWDER, FOR SOLUTION INTRAMUSCULAR; INTRAVENOUS at 12:19

## 2019-03-09 RX ADMIN — PIPERACILLIN AND TAZOBACTAM SCH MLS/HR: 4; .5 INJECTION, POWDER, LYOPHILIZED, FOR SOLUTION INTRAVENOUS; PARENTERAL at 23:55

## 2019-03-09 RX ADMIN — PIPERACILLIN AND TAZOBACTAM SCH MLS/HR: 4; .5 INJECTION, POWDER, LYOPHILIZED, FOR SOLUTION INTRAVENOUS; PARENTERAL at 12:35

## 2019-03-09 RX ADMIN — PIPERACILLIN AND TAZOBACTAM SCH MLS/HR: 4; .5 INJECTION, POWDER, LYOPHILIZED, FOR SOLUTION INTRAVENOUS; PARENTERAL at 18:10

## 2019-03-09 RX ADMIN — METHYLPREDNISOLONE SODIUM SUCCINATE SCH MG: 40 INJECTION, POWDER, FOR SOLUTION INTRAMUSCULAR; INTRAVENOUS at 23:31

## 2019-03-09 RX ADMIN — BUDESONIDE SCH MG: 0.5 SUSPENSION RESPIRATORY (INHALATION) at 21:05

## 2019-03-09 RX ADMIN — BUDESONIDE SCH MG: 0.5 SUSPENSION RESPIRATORY (INHALATION) at 08:33

## 2019-03-09 RX ADMIN — LEVALBUTEROL HYDROCHLORIDE SCH MG: 1.25 SOLUTION RESPIRATORY (INHALATION) at 16:05

## 2019-03-09 NOTE — RADIOLOGY REPORT (SQ)
EXAM DESCRIPTION:  CHEST SINGLE VIEW



COMPLETED DATE/TIME:  3/9/2019 7:31 am



REASON FOR STUDY:  On vent



COMPARISON:  3/8/2019



NUMBER OF VIEWS:  One view.



TECHNIQUE:  Single frontal radiographic image of the chest acquired.



LIMITATIONS:  None.



FINDINGS:  LUNGS AND PLEURA: No infiltrate.  No pneumothorax.

MEDIASTINUM AND HEART: Stable heart size and mediastinal structures.

SUPPORT DEVICES: Appropriate location without change.

BONY STRUCTURES: No acute findings.

HARDWARE: None.

OTHER: No other significant finding.



IMPRESSION:  STABLE APPEARANCE OF THE CHEST.  SUPPORT DEVICES UNCHANGED.



Reading location - IP/workstation name: [email or web address]

## 2019-03-09 NOTE — PROGRESS NOTE E
Progress Note



NAME: NIKOS FERGUSON

MRN: [id number]

: 1990             AGE: 28Y

DATE:  2019                    ROOM: 611



SUBJECTIVE:

The patient is lying in bed.  The patient did have some episodes of

agitation overnight.  Apparently, the patient's significant other has

expressed that she does drink a substantial amount of alcohol.  The

patient has been afebrile.  Her blood pressure has been in good range. 

The patient has been somewhat tachycardic through the night with average

rate around 110; when she gets agitated, she does go up to 130, but

settles back down.  Urine output has been stable, and the patient has not

been producing a lot of secretions and is unable to express any concerns

at this time.



REVIEW OF SYSTEMS:

Unobtainable.



MEDICATIONS:

Reviewed.



OBJECTIVE:

GENERAL:  The patient is a 28-year-old  female who is currently

intubated and sedated.  Does not appear to be distressed.



VITAL SIGNS:  Temperature 97.9, pulse 103, respirations 22, blood pressure

124/75, oxygen saturation 93% on 40% FiO2.  The patient was on 50% FiO2;

however, when I was in the room I was able to adjust down to 45% and then

40%.  The patient appears to tolerate this okay.



SKIN:  Pale, dry.  No rash.  She is not diaphoretic.



HEENT:  Pupils are reactive.  Conjunctivae pink.  No evidence of JVP.  ET

tube in place with Nona.



CVS:  Heart is tachycardic, regular, no rub.



CHEST:  Symmetrical, unlabored.  Relatively clear.



ABDOMEN:  Soft, nontender, nondistended.



EXTREMITIES:  No clubbing, cyanosis, or edema.



PSYCHIATRIC:  Unable to assess.



DIAGNOSTICS/LAB VALUES:

Hematology obtained on 2019:  WBC 12.8, hemoglobin 11.9, hematocrit

35.7, platelet count 101,000.



Chemistry obtained on 2019:  Sodium 140, potassium 4.1, chloride

111, carbon dioxide 21, BUN 7, creatinine 0.38, glucose 210, calcium 8.7,

magnesium 2.2, bilirubin 0.4.  AST 8, ALT 16, alkaline phosphatase 62. 

Total protein 6.0, albumin 3.5.



ASSESSMENT AND PLAN:

1.   BILATERAL PNEUMONITIS.  Possibly of viral etiology.  The patient remains

     on broad-spectrum antibiotic coverage given the severity of her

     illness.  Chest x-ray this morning looks relatively clear.  Will

     follow.

2.   ACUTE HYPOXEMIC RESPIRATORY FAILURE.  The patient remains mechanically

     intubated.  Repeat chest x-ray this morning is clear.  I do

     appreciate Pulmonology's support with this.  Will continue to monitor

     closely.

3.   ALCOHOL USE.  Suspect possible abuse versus dependency.  Will supplement B

     vitamins.  The patient does remain on Versed as well.



DISPOSITION:

The patient is full code.  Depending on the patient's symptomatology and

diagnostic findings, will reevaluate in the a.m. or as needed.



Time spent on this critical care visit including assessment, plan,

physical examination, and attempt at patient education is 35 minutes.





DICTATING PHYSICIAN:  JONG YEPEZ NP





[id number]                  DT: 2019    1205

PHY#: [id number]            DD: 2019    1007

ID:   [id number]           JOB#: [id number]       ACCT: [id number]



cc:

>







MTDD

## 2019-03-10 LAB
ABSOLUTE LYMPHOCYTES# (MANUAL): 0.4 10^3/UL (ref 0.5–4.7)
ABSOLUTE MONOCYTES # (MANUAL): 0.2 10^3/UL (ref 0.1–1.4)
ABSOLUTE NEUTROPHILS# (MANUAL): 10.2 10^3/UL (ref 1.7–8.2)
ADD MANUAL DIFF: YES
ALBUMIN SERPL-MCNC: 3.1 G/DL (ref 3.5–5)
ALP SERPL-CCNC: 52 U/L (ref 38–126)
ALT SERPL-CCNC: 12 U/L (ref 9–52)
ANION GAP SERPL CALC-SCNC: 9 MMOL/L (ref 5–19)
ANISOCYTOSIS BLD QL SMEAR: SLIGHT
ARTERIAL BLOOD FIO2: (no result)
ARTERIAL BLOOD H2CO3: 1.08 MMOL/L (ref 1.05–1.35)
ARTERIAL BLOOD HCO3: 22.9 MMOL/L (ref 20–24)
ARTERIAL BLOOD PCO2: 35.8 MMHG (ref 35–45)
ARTERIAL BLOOD PH: 7.42 (ref 7.35–7.45)
ARTERIAL BLOOD PO2: 69.8 MMHG (ref 80–100)
ARTERIAL BLOOD TOTAL CO2: 24 MMOL/L (ref 21–25)
AST SERPL-CCNC: 10 U/L (ref 14–36)
BASE EXCESS BLDA CALC-SCNC: -1.1 MMOL/L
BASOPHILS NFR BLD MANUAL: 0 % (ref 0–2)
BILIRUB DIRECT SERPL-MCNC: 0.3 MG/DL (ref 0–0.4)
BILIRUB SERPL-MCNC: 0.4 MG/DL (ref 0.2–1.3)
BUN SERPL-MCNC: 8 MG/DL (ref 7–20)
CALCIUM: 8.7 MG/DL (ref 8.4–10.2)
CHLORIDE SERPL-SCNC: 111 MMOL/L (ref 98–107)
CO2 SERPL-SCNC: 20 MMOL/L (ref 22–30)
DACRYOCYTES BLD QL SMEAR: SLIGHT
EOSINOPHIL NFR BLD MANUAL: 0 % (ref 0–6)
ERYTHROCYTE [DISTWIDTH] IN BLOOD BY AUTOMATED COUNT: 14.3 % (ref 11.5–14)
GLUCOSE SERPL-MCNC: 151 MG/DL (ref 75–110)
HCT VFR BLD CALC: 33.8 % (ref 36–47)
HGB BLD-MCNC: 11.6 G/DL (ref 12–15.5)
MCH RBC QN AUTO: 30.5 PG (ref 27–33.4)
MCHC RBC AUTO-ENTMCNC: 34.3 G/DL (ref 32–36)
MCV RBC AUTO: 89 FL (ref 80–97)
MONOCYTES % (MANUAL): 2 % (ref 3–13)
OVALOCYTES BLD QL SMEAR: SLIGHT
PLATELET # BLD: 99 10^3/UL (ref 150–450)
PLATELET COMMENT: (no result)
POIKILOCYTOSIS BLD QL SMEAR: SLIGHT
POTASSIUM SERPL-SCNC: 4 MMOL/L (ref 3.6–5)
PROT SERPL-MCNC: 5.5 G/DL (ref 6.3–8.2)
RBC # BLD AUTO: 3.8 10^6/UL (ref 3.72–5.28)
SAO2 % BLDA: 94.5 % (ref 94–98)
SEGMENTED NEUTROPHILS % (MAN): 94 % (ref 42–78)
SODIUM SERPL-SCNC: 140.1 MMOL/L (ref 137–145)
TOTAL CELLS COUNTED BLD: 100
TOXIC GRANULES BLD QL SMEAR: (no result)
VARIANT LYMPHS NFR BLD MANUAL: 4 % (ref 13–45)
WBC # BLD AUTO: 10.9 10^3/UL (ref 4–10.5)

## 2019-03-10 RX ADMIN — DOXYCYCLINE SCH MLS/HR: 100 INJECTION, POWDER, LYOPHILIZED, FOR SOLUTION INTRAVENOUS at 10:34

## 2019-03-10 RX ADMIN — DOXYCYCLINE SCH MLS/HR: 100 INJECTION, POWDER, LYOPHILIZED, FOR SOLUTION INTRAVENOUS at 21:38

## 2019-03-10 RX ADMIN — IPRATROPIUM BROMIDE SCH MG: 0.5 SOLUTION RESPIRATORY (INHALATION) at 16:21

## 2019-03-10 RX ADMIN — FONDAPARINUX SODIUM SCH MG: 2.5 INJECTION, SOLUTION SUBCUTANEOUS at 08:34

## 2019-03-10 RX ADMIN — MIDAZOLAM HYDROCHLORIDE PRN MLS/HR: 5 INJECTION, SOLUTION INTRAMUSCULAR; INTRAVENOUS at 03:33

## 2019-03-10 RX ADMIN — PROPOFOL PRN MLS/HR: 10 INJECTION, EMULSION INTRAVENOUS at 20:41

## 2019-03-10 RX ADMIN — LEVETIRACETAM SCH MLS/HR: 10 INJECTION INTRAVENOUS at 10:34

## 2019-03-10 RX ADMIN — PROPOFOL PRN MLS/HR: 10 INJECTION, EMULSION INTRAVENOUS at 08:51

## 2019-03-10 RX ADMIN — PROPOFOL PRN MLS/HR: 10 INJECTION, EMULSION INTRAVENOUS at 16:56

## 2019-03-10 RX ADMIN — BUDESONIDE SCH MG: 0.5 SUSPENSION RESPIRATORY (INHALATION) at 08:28

## 2019-03-10 RX ADMIN — ACETAMINOPHEN PRN MG: 650 SUPPOSITORY RECTAL at 21:56

## 2019-03-10 RX ADMIN — LEVALBUTEROL HYDROCHLORIDE SCH MG: 1.25 SOLUTION RESPIRATORY (INHALATION) at 23:48

## 2019-03-10 RX ADMIN — DEXTROSE, SODIUM CHLORIDE, AND POTASSIUM CHLORIDE PRN MLS/HR: 5; .45; .15 INJECTION INTRAVENOUS at 10:42

## 2019-03-10 RX ADMIN — Medication SCH MG: at 10:37

## 2019-03-10 RX ADMIN — PANTOPRAZOLE SODIUM SCH MG: 40 INJECTION, POWDER, FOR SOLUTION INTRAVENOUS at 10:38

## 2019-03-10 RX ADMIN — IPRATROPIUM BROMIDE SCH MG: 0.5 SOLUTION RESPIRATORY (INHALATION) at 00:53

## 2019-03-10 RX ADMIN — LEVETIRACETAM SCH MLS/HR: 10 INJECTION INTRAVENOUS at 21:36

## 2019-03-10 RX ADMIN — LEVALBUTEROL HYDROCHLORIDE SCH MG: 1.25 SOLUTION RESPIRATORY (INHALATION) at 08:28

## 2019-03-10 RX ADMIN — PANTOPRAZOLE SODIUM SCH MG: 40 INJECTION, POWDER, FOR SOLUTION INTRAVENOUS at 21:39

## 2019-03-10 RX ADMIN — MIDAZOLAM HYDROCHLORIDE PRN MLS/HR: 5 INJECTION, SOLUTION INTRAMUSCULAR; INTRAVENOUS at 09:36

## 2019-03-10 RX ADMIN — PROPOFOL PRN MLS/HR: 10 INJECTION, EMULSION INTRAVENOUS at 12:51

## 2019-03-10 RX ADMIN — PIPERACILLIN AND TAZOBACTAM SCH MLS/HR: 4; .5 INJECTION, POWDER, LYOPHILIZED, FOR SOLUTION INTRAVENOUS; PARENTERAL at 23:36

## 2019-03-10 RX ADMIN — IPRATROPIUM BROMIDE SCH MG: 0.5 SOLUTION RESPIRATORY (INHALATION) at 08:28

## 2019-03-10 RX ADMIN — PIPERACILLIN AND TAZOBACTAM SCH MLS/HR: 4; .5 INJECTION, POWDER, LYOPHILIZED, FOR SOLUTION INTRAVENOUS; PARENTERAL at 17:02

## 2019-03-10 RX ADMIN — MIDAZOLAM HYDROCHLORIDE PRN MLS/HR: 5 INJECTION, SOLUTION INTRAMUSCULAR; INTRAVENOUS at 20:40

## 2019-03-10 RX ADMIN — DEXTROSE, SODIUM CHLORIDE, AND POTASSIUM CHLORIDE PRN MLS/HR: 5; .45; .15 INJECTION INTRAVENOUS at 20:43

## 2019-03-10 RX ADMIN — Medication SCH ML: at 21:39

## 2019-03-10 RX ADMIN — PIPERACILLIN AND TAZOBACTAM SCH MLS/HR: 4; .5 INJECTION, POWDER, LYOPHILIZED, FOR SOLUTION INTRAVENOUS; PARENTERAL at 12:53

## 2019-03-10 RX ADMIN — BUDESONIDE SCH MG: 0.5 SUSPENSION RESPIRATORY (INHALATION) at 21:03

## 2019-03-10 RX ADMIN — LEVALBUTEROL HYDROCHLORIDE SCH MG: 1.25 SOLUTION RESPIRATORY (INHALATION) at 00:53

## 2019-03-10 RX ADMIN — LEVALBUTEROL HYDROCHLORIDE SCH MG: 1.25 SOLUTION RESPIRATORY (INHALATION) at 16:21

## 2019-03-10 RX ADMIN — PROPOFOL PRN MLS/HR: 10 INJECTION, EMULSION INTRAVENOUS at 04:51

## 2019-03-10 RX ADMIN — FOLIC ACID SCH MG: 1 TABLET ORAL at 10:33

## 2019-03-10 RX ADMIN — PIPERACILLIN AND TAZOBACTAM SCH MLS/HR: 4; .5 INJECTION, POWDER, LYOPHILIZED, FOR SOLUTION INTRAVENOUS; PARENTERAL at 05:09

## 2019-03-10 RX ADMIN — POTASSIUM CHLORIDE SCH: 29.8 INJECTION, SOLUTION INTRAVENOUS at 01:44

## 2019-03-10 RX ADMIN — DEXTROSE, SODIUM CHLORIDE, AND POTASSIUM CHLORIDE PRN MLS/HR: 5; .45; .15 INJECTION INTRAVENOUS at 01:32

## 2019-03-10 RX ADMIN — Medication SCH ML: at 12:52

## 2019-03-10 RX ADMIN — IPRATROPIUM BROMIDE SCH MG: 0.5 SOLUTION RESPIRATORY (INHALATION) at 23:48

## 2019-03-10 RX ADMIN — Medication SCH ML: at 14:01

## 2019-03-10 RX ADMIN — MIDAZOLAM HYDROCHLORIDE PRN MLS/HR: 5 INJECTION, SOLUTION INTRAMUSCULAR; INTRAVENOUS at 15:21

## 2019-03-10 NOTE — RADIOLOGY REPORT (SQ)
EXAM DESCRIPTION:  CHEST SINGLE VIEW



COMPLETED DATE/TIME:  3/10/2019 6:51 am



REASON FOR STUDY:  On vent



COMPARISON:  3/9/2019



NUMBER OF VIEWS:  One view.



TECHNIQUE:  Single frontal radiographic image of the chest acquired.



LIMITATIONS:  None.



FINDINGS:  LUNGS AND PLEURA: Subsegmental airspace disease left lower lobe lung.  No pneumothorax.

MEDIASTINUM AND HEART: Stable heart size and mediastinal structures.

SUPPORT DEVICES: Appropriate location without change.

BONY STRUCTURES: No acute findings.

HARDWARE: None.

OTHER: No other significant finding.



IMPRESSION:  Left basilar atelectasis or pneumonia.  No pneumothorax.



Reading location - IP/workstation name: [email or web address]

## 2019-03-10 NOTE — RADIOLOGY REPORT (SQ)
EXAM DESCRIPTION:  CT HEAD WITHOUT



COMPLETED DATE/TIME:  3/10/2019 11:34 am



REASON FOR STUDY:  Involuntary movements



COMPARISON:  None.



TECHNIQUE:  Axial images acquired through the brain without intravenous contrast.  Images reviewed wi
th bone, brain and subdural windows.  Additional sagittal and coronal reconstructions were generated.
 Images stored on PACS.

All CT scanners at this facility use dose modulation, iterative reconstruction, and/or weight based d
osing when appropriate to reduce radiation dose to as low as reasonably achievable (ALARA).

[facility]: Dose Right  [facility]: CareDose    [facility]: Dose Right    [facility]: Teradose 4D    [facility]: Smart Technologies



RADIATION DOSE:  CT Rad equipment meets quality standard of care and radiation dose reduction techniq
ues were employed. CTDIvol: 53.2 mGy. DLP: 1070 mGy-cm. mGy.



LIMITATIONS:  None.



FINDINGS:  VENTRICLES: Normal size and contour.

CEREBRUM: No masses.  No hemorrhage.  No midline shift.  No evidence for acute infarction. Normal gra
y/white matter differentiation. No areas of low density in the white matter.

CEREBELLUM: No masses.  No hemorrhage.  No alteration of density.  No evidence for acute infarction.

EXTRAAXIAL SPACES: No fluid collections.  No masses.

ORBITS AND GLOBE: No intra- or extraconal masses.  Normal contour of globe without masses.

CALVARIUM: No fracture.

PARANASAL SINUSES: No fluid or mucosal thickening.

SOFT TISSUES: No mass or hematoma.

OTHER: No other significant finding.



IMPRESSION:  NORMAL BRAIN CT WITHOUT CONTRAST.

EVIDENCE OF ACUTE STROKE: NO.



COMMENT:  Quality ID # 436: Final reports with documentation of one or more dose reduction techniques
 (e.g., Automated exposure control, adjustment of the mA and/or kV according to patient size, use of 
iterative reconstruction technique)



TECHNICAL DOCUMENTATION:  JOB ID:  [id number]

 2011 [employer]- All Rights Reserved



Reading location - IP/workstation name: [email or web address]

## 2019-03-11 LAB
ADD MANUAL DIFF: NO
ALBUMIN SERPL-MCNC: 2.8 G/DL (ref 3.5–5)
ALP SERPL-CCNC: 43 U/L (ref 38–126)
ALT SERPL-CCNC: 12 U/L (ref 9–52)
ANION GAP SERPL CALC-SCNC: 9 MMOL/L (ref 5–19)
ARTERIAL BLOOD FIO2: (no result)
ARTERIAL BLOOD H2CO3: 1.17 MMOL/L (ref 1.05–1.35)
ARTERIAL BLOOD HCO3: 23.7 MMOL/L (ref 20–24)
ARTERIAL BLOOD PCO2: 38.9 MMHG (ref 35–45)
ARTERIAL BLOOD PH: 7.4 (ref 7.35–7.45)
ARTERIAL BLOOD PO2: 102.9 MMHG (ref 80–100)
ARTERIAL BLOOD TOTAL CO2: 24.9 MMOL/L (ref 21–25)
AST SERPL-CCNC: 11 U/L (ref 14–36)
BASE EXCESS BLDA CALC-SCNC: -0.9 MMOL/L
BASOPHILS # BLD AUTO: 0 10^3/UL (ref 0–0.2)
BASOPHILS NFR BLD AUTO: 0.1 % (ref 0–2)
BILIRUB DIRECT SERPL-MCNC: 0.3 MG/DL (ref 0–0.4)
BILIRUB SERPL-MCNC: 0.4 MG/DL (ref 0.2–1.3)
BUN SERPL-MCNC: 8 MG/DL (ref 7–20)
CALCIUM: 8.2 MG/DL (ref 8.4–10.2)
CHLORIDE SERPL-SCNC: 109 MMOL/L (ref 98–107)
CO2 SERPL-SCNC: 22 MMOL/L (ref 22–30)
EOSINOPHIL # BLD AUTO: 0.1 10^3/UL (ref 0–0.6)
EOSINOPHIL NFR BLD AUTO: 0.8 % (ref 0–6)
ERYTHROCYTE [DISTWIDTH] IN BLOOD BY AUTOMATED COUNT: 14.5 % (ref 11.5–14)
GLUCOSE SERPL-MCNC: 105 MG/DL (ref 75–110)
HCT VFR BLD CALC: 32.6 % (ref 36–47)
HGB BLD-MCNC: 11 G/DL (ref 12–15.5)
LYMPHOCYTES # BLD AUTO: 1.7 10^3/UL (ref 0.5–4.7)
LYMPHOCYTES NFR BLD AUTO: 21.9 % (ref 13–45)
MCH RBC QN AUTO: 30.2 PG (ref 27–33.4)
MCHC RBC AUTO-ENTMCNC: 33.6 G/DL (ref 32–36)
MCV RBC AUTO: 90 FL (ref 80–97)
MONOCYTES # BLD AUTO: 0.8 10^3/UL (ref 0.1–1.4)
MONOCYTES NFR BLD AUTO: 10.2 % (ref 3–13)
NEUTROPHILS # BLD AUTO: 5.2 10^3/UL (ref 1.7–8.2)
NEUTS SEG NFR BLD AUTO: 67 % (ref 42–78)
PLATELET # BLD: 90 10^3/UL (ref 150–450)
POTASSIUM SERPL-SCNC: 3.8 MMOL/L (ref 3.6–5)
PROT SERPL-MCNC: 5.1 G/DL (ref 6.3–8.2)
RBC # BLD AUTO: 3.63 10^6/UL (ref 3.72–5.28)
SAO2 % BLDA: 97.7 % (ref 94–98)
SODIUM SERPL-SCNC: 139.7 MMOL/L (ref 137–145)
TOTAL CELLS COUNTED % (AUTO): 100 %
WBC # BLD AUTO: 7.7 10^3/UL (ref 4–10.5)

## 2019-03-11 RX ADMIN — PROPOFOL PRN MLS/HR: 10 INJECTION, EMULSION INTRAVENOUS at 16:28

## 2019-03-11 RX ADMIN — DEXTROSE, SODIUM CHLORIDE, AND POTASSIUM CHLORIDE PRN MLS/HR: 5; .45; .15 INJECTION INTRAVENOUS at 07:59

## 2019-03-11 RX ADMIN — PIPERACILLIN AND TAZOBACTAM SCH MLS/HR: 4; .5 INJECTION, POWDER, LYOPHILIZED, FOR SOLUTION INTRAVENOUS; PARENTERAL at 17:29

## 2019-03-11 RX ADMIN — FOLIC ACID SCH MG: 1 TABLET ORAL at 10:52

## 2019-03-11 RX ADMIN — KETOROLAC TROMETHAMINE PRN MG: 30 INJECTION, SOLUTION INTRAMUSCULAR at 17:39

## 2019-03-11 RX ADMIN — MIDAZOLAM HYDROCHLORIDE PRN MLS/HR: 5 INJECTION, SOLUTION INTRAMUSCULAR; INTRAVENOUS at 11:03

## 2019-03-11 RX ADMIN — PIPERACILLIN AND TAZOBACTAM SCH MLS/HR: 4; .5 INJECTION, POWDER, LYOPHILIZED, FOR SOLUTION INTRAVENOUS; PARENTERAL at 12:59

## 2019-03-11 RX ADMIN — FONDAPARINUX SODIUM SCH MG: 2.5 INJECTION, SOLUTION SUBCUTANEOUS at 13:24

## 2019-03-11 RX ADMIN — LEVETIRACETAM SCH MLS/HR: 10 INJECTION INTRAVENOUS at 21:49

## 2019-03-11 RX ADMIN — ACETAMINOPHEN PRN MG: 650 SUPPOSITORY RECTAL at 13:16

## 2019-03-11 RX ADMIN — DEXTROSE, SODIUM CHLORIDE, AND POTASSIUM CHLORIDE PRN MLS/HR: 5; .45; .15 INJECTION INTRAVENOUS at 16:30

## 2019-03-11 RX ADMIN — FONDAPARINUX SODIUM SCH: 2.5 INJECTION, SOLUTION SUBCUTANEOUS at 10:51

## 2019-03-11 RX ADMIN — BUDESONIDE SCH MG: 0.5 SUSPENSION RESPIRATORY (INHALATION) at 21:02

## 2019-03-11 RX ADMIN — Medication SCH: at 14:44

## 2019-03-11 RX ADMIN — IPRATROPIUM BROMIDE SCH MG: 0.5 SOLUTION RESPIRATORY (INHALATION) at 16:25

## 2019-03-11 RX ADMIN — MIDAZOLAM HYDROCHLORIDE PRN MLS/HR: 5 INJECTION, SOLUTION INTRAMUSCULAR; INTRAVENOUS at 10:16

## 2019-03-11 RX ADMIN — DOXYCYCLINE SCH MLS/HR: 100 INJECTION, POWDER, LYOPHILIZED, FOR SOLUTION INTRAVENOUS at 21:50

## 2019-03-11 RX ADMIN — Medication SCH ML: at 21:50

## 2019-03-11 RX ADMIN — KETOROLAC TROMETHAMINE PRN MG: 30 INJECTION, SOLUTION INTRAMUSCULAR at 00:36

## 2019-03-11 RX ADMIN — PROPOFOL PRN MLS/HR: 10 INJECTION, EMULSION INTRAVENOUS at 00:10

## 2019-03-11 RX ADMIN — PIPERACILLIN AND TAZOBACTAM SCH MLS/HR: 4; .5 INJECTION, POWDER, LYOPHILIZED, FOR SOLUTION INTRAVENOUS; PARENTERAL at 05:12

## 2019-03-11 RX ADMIN — MIDAZOLAM HYDROCHLORIDE PRN MLS/HR: 5 INJECTION, SOLUTION INTRAMUSCULAR; INTRAVENOUS at 02:24

## 2019-03-11 RX ADMIN — LEVETIRACETAM SCH MLS/HR: 10 INJECTION INTRAVENOUS at 12:59

## 2019-03-11 RX ADMIN — Medication SCH: at 06:28

## 2019-03-11 RX ADMIN — FONDAPARINUX SODIUM SCH MG: 2.5 INJECTION, SOLUTION SUBCUTANEOUS at 10:19

## 2019-03-11 RX ADMIN — DOXYCYCLINE SCH MLS/HR: 100 INJECTION, POWDER, LYOPHILIZED, FOR SOLUTION INTRAVENOUS at 13:12

## 2019-03-11 RX ADMIN — KETOROLAC TROMETHAMINE PRN MG: 30 INJECTION, SOLUTION INTRAMUSCULAR at 10:15

## 2019-03-11 RX ADMIN — PROPOFOL PRN MLS/HR: 10 INJECTION, EMULSION INTRAVENOUS at 04:04

## 2019-03-11 RX ADMIN — Medication SCH MG: at 13:24

## 2019-03-11 RX ADMIN — LEVALBUTEROL HYDROCHLORIDE SCH MG: 1.25 SOLUTION RESPIRATORY (INHALATION) at 07:41

## 2019-03-11 RX ADMIN — LEVALBUTEROL HYDROCHLORIDE SCH MG: 1.25 SOLUTION RESPIRATORY (INHALATION) at 16:25

## 2019-03-11 RX ADMIN — BUDESONIDE SCH MG: 0.5 SUSPENSION RESPIRATORY (INHALATION) at 07:41

## 2019-03-11 RX ADMIN — MIDAZOLAM HYDROCHLORIDE PRN MLS/HR: 5 INJECTION, SOLUTION INTRAMUSCULAR; INTRAVENOUS at 16:29

## 2019-03-11 RX ADMIN — PROPOFOL PRN MLS/HR: 10 INJECTION, EMULSION INTRAVENOUS at 20:32

## 2019-03-11 RX ADMIN — IPRATROPIUM BROMIDE SCH MG: 0.5 SOLUTION RESPIRATORY (INHALATION) at 07:41

## 2019-03-11 NOTE — RADIOLOGY REPORT (SQ)
EXAM DESCRIPTION:  CHEST SINGLE VIEW



COMPLETED DATE/TIME:  3/11/2019 6:59 am



REASON FOR STUDY:  On vent



COMPARISON:  3/10/2019.



FINDINGS:  Single-view chest AP portable upright timed 0636 hours in PACs.

Endotracheal and nasogastric tubes remain in good position.

Mild volume loss/ consolidation in the left base, chronic.

Overall stable appearance the chest.



TECHNICAL DOCUMENTATION:  JOB ID:  [id number]



Reading location - IP/workstation name: [email or web address]

## 2019-03-11 NOTE — PDOC PROGRESS REPORT
Subjective


Progress Note for:: 03/11/19


Subjective:: 


Is a 28 years old  female patient presents to [facility] 

ER with chief complaint of shortness of breath which is getting worse 

progressively.  Patient initially tried to use BiPAP but shortness of breath 

failed to respond and her condition deteriorated which required emergency 

intubation and transfer to ICU.  This morning I seen patient intubated.  She is 

now on pressure support and scheduled for weaning trial.


Reason For Visit: 


ACUTE RESPIRATORY FAILURE WITH HYPOXIA








Physical Exam


Vital Signs: 


                                        











Temp Pulse Resp BP Pulse Ox


 


 102.4 F H  94   23 H  138/76 H  98 


 


 03/11/19 13:16  03/11/19 12:00  03/11/19 12:00  03/11/19 12:00  03/11/19 12:53








                                 Intake & Output











 03/10/19 03/11/19 03/12/19





 06:59 06:59 06:59


 


Intake Total  4655 506


 


Output Total  3475 900


 


Balance  1180 -394


 


Weight  65 kg 











General appearance: PRESENT: no acute distress


Eye exam: PRESENT: conjunctiva pink


Neck exam: ABSENT: carotid bruit, JVD, lymphadenopathy, thyromegaly


Respiratory exam: PRESENT: clear to auscultation lan.  ABSENT: rales, rhonchi, 

wheezes


Cardiovascular exam: PRESENT: RRR.  ABSENT: diastolic murmur, rubs, systolic 

murmur


GI/Abdominal exam: PRESENT: normal bowel sounds, soft.  ABSENT: distended, 

guarding, mass, organolmegaly, rebound, tenderness





Results


Laboratory Results: 


                                        





                                 03/11/19 04:28 





                                 03/11/19 04:28 





                                        











  03/11/19 03/11/19 03/11/19





  04:20 04:28 04:28


 


WBC   7.7 


 


RBC   3.63 L 


 


Hgb   11.0 L 


 


Hct   32.6 L 


 


MCV   90 


 


MCH   30.2 


 


MCHC   33.6 


 


RDW   14.5 H 


 


Plt Count   90 L 


 


Seg Neutrophils %   67.0 


 


Lymphocytes %   21.9 


 


Monocytes %   10.2 


 


Eosinophils %   0.8 


 


Basophils %   0.1 


 


Absolute Neutrophils   5.2 


 


Absolute Lymphocytes   1.7 


 


Absolute Monocytes   0.8 


 


Absolute Eosinophils   0.1 


 


Absolute Basophils   0.0 


 


Carbonic Acid  Cancelled  


 


HCO3/H2CO3 Ratio  Cancelled  


 


ABG pH  Cancelled  


 


ABG pCO2  Cancelled  


 


ABG pO2  Cancelled  


 


ABG HCO3  Cancelled  


 


ABG O2 Saturation  Cancelled  


 


ABG Base Excess  Cancelled  


 


FiO2  Cancelled  


 


Sodium    139.7


 


Potassium    3.8


 


Chloride    109 H


 


Carbon Dioxide    22


 


Anion Gap    9


 


BUN    8


 


Creatinine    0.45 L


 


Est GFR ( Amer)    > 60


 


Est GFR (Non-Af Amer)    > 60


 


Glucose    105


 


Calcium    8.2 L


 


Magnesium    2.1


 


Total Bilirubin    0.4


 


AST    11 L


 


ALT    12


 


Alkaline Phosphatase    43


 


Total Protein    5.1 L


 


Albumin    2.8 L














  03/11/19





  05:23


 


WBC 


 


RBC 


 


Hgb 


 


Hct 


 


MCV 


 


MCH 


 


MCHC 


 


RDW 


 


Plt Count 


 


Seg Neutrophils % 


 


Lymphocytes % 


 


Monocytes % 


 


Eosinophils % 


 


Basophils % 


 


Absolute Neutrophils 


 


Absolute Lymphocytes 


 


Absolute Monocytes 


 


Absolute Eosinophils 


 


Absolute Basophils 


 


Carbonic Acid  1.17


 


HCO3/H2CO3 Ratio  20:1


 


ABG pH  7.40


 


ABG pCO2  38.9


 


ABG pO2  102.9 H


 


ABG HCO3  23.7


 


ABG O2 Saturation  97.7


 


ABG Base Excess  -0.9


 


FiO2  45%


 


Sodium 


 


Potassium 


 


Chloride 


 


Carbon Dioxide 


 


Anion Gap 


 


BUN 


 


Creatinine 


 


Est GFR ( Amer) 


 


Est GFR (Non-Af Amer) 


 


Glucose 


 


Calcium 


 


Magnesium 


 


Total Bilirubin 


 


AST 


 


ALT 


 


Alkaline Phosphatase 


 


Total Protein 


 


Albumin 








                                        











  03/08/19 03/08/19 03/08/19





  00:39 00:39 06:40


 


Creatine Kinase  39   Cancelled


 


CK-MB (CK-2)   < 0.22 


 


Troponin I   < 0.012 


 


NT-Pro-B Natriuret Pep   














  03/08/19 03/08/19 03/08/19





  06:40 07:05 07:05


 


Creatine Kinase    29 L


 


CK-MB (CK-2)  Cancelled  < 0.22 


 


Troponin I  Cancelled  < 0.012 


 


NT-Pro-B Natriuret Pep   














  03/08/19 03/08/19 03/09/19





  12:36 12:36 03:45


 


Creatine Kinase  38  


 


CK-MB (CK-2)   0.82 


 


Troponin I   < 0.012 


 


NT-Pro-B Natriuret Pep    358 H











Impressions: 


                                        





Chest/Abdomen CTA  03/08/19 01:41


IMPRESSION:


 


Negative for pulmonary embolus.


 


Perihilar groundglass opacities bilaterally consistent with


pneumonitis


 


TECHNICAL DOCUMENTATION:


 


Quality ID # 436: Final reports with documentation of one or more


dose reduction techniques (e.g., Automated exposure control,


adjustment of the mA and/or kV according to patient size, use of


iterative reconstruction technique)


 


copyright 2011 [employer]- All Rights Reserved


 








Head CT  03/10/19 00:00


IMPRESSION:  NORMAL BRAIN CT WITHOUT CONTRAST.


EVIDENCE OF ACUTE STROKE: NO.


 














Assessment & Plan





- Diagnosis


(1) Acute respiratory failure with hypoxia


Is this a current diagnosis for this admission?: Yes   


Plan: 


Patient scheduled for weaning trial and later extubation.








(2) Dyspnea and respiratory abnormalities


Is this a current diagnosis for this admission?: Yes   


Plan: 


Continue current regimen








(3) Tobacco dependence


Is this a current diagnosis for this admission?: Yes   


Plan: 


Patient will be counseled and encouraged to quit smoking, and she fully awake 

alert and oriented.








(4) GERD (gastroesophageal reflux disease)


Qualifiers: 


   Esophagitis presence: without esophagitis   Qualified Code(s): K21.9 - 

Gastro-esophageal reflux disease without esophagitis   


Is this a current diagnosis for this admission?: Yes   


Plan: 


Continue PPI.

## 2019-03-11 NOTE — PROGRESS NOTE E
Progress Note



NAME: NIKOS FERGUSON

MRN: [id number]

: 1990             AGE: 28Y

DATE:  03/10/2019           ROOM: 611



 

SUBJECTIVE:

The patient was seen this morning on rounds.  The patient was found to be

quite agitated with significant hypertension, especially diastolic.  The

patient was tachypneic with a rate in the 40s and the patient was jerking.

The patient had no definite evidence of seizures.  She still had reflexes,

however, it appeared she was having involuntary spasms at times.  The

patient's sedation was increased and these did diminish with an increase

of sedation.  But, it did take sometime.



Did order a STAT head CT which was found to be unremarkable.  The patient

was loaded with Keppra to just ensure there was no withdrawal seizures so

forth.  The patient appears to be having significant DTs at this time and

did respond nicely to valium in addition to her other sedatives.  The

patient has remained afebrile.  Her blood pressures have been

significantly elevated and the patient is unable to voice any specific

concerns at this time.



BRIEF HISTORY:

The patient is a 28-year-old  female with a past medical history

of heavy drinking as well as tobacco dependency and possible asthma.  The

patient presented to the Emergency Department with a chief complaint of

shortness of breath, significant tachypnea.  The patient's blood gas was

not too bad; however, the patient was breathing upwards of 40 to 50 times

a minute and subsequently required intubation.  The patient did well

initially, however, the patient became significantly hypertensive into the

second day as well as tachycardic and agitated.  The patient's significant

other did endorse heavy alcohol use.  The patient appears to have

bilateral pneumonitis, which may possibly be an aspiration etiology versus

a viral process versus a community acquired pneumonia.  Therefore, the

patient has been covered for the above and sputum has been unable to be

sent.



REVIEW OF SYSTEMS:

Unobtainable.



MEDICATIONS:

Reviewed.



OBJECTIVE:

GENERAL:  The patient is a 28-year-old  female who is intubated,

sedated, who is more comfortable now but earlier was distressed.



VITAL SIGNS:  Temperature is 98.1, pulse 122, respirations 32, blood

pressure is 167/113, oxygen saturation is 92% on 40% FiO2.



SKIN:  Warm and dry.  No rash.  She is not diaphoretic.



HEENT:  Her pupils are sluggish but reactive.  Conjunctivae pink.  ET tube

is in place.



CARDIOVASCULAR:  Heart is regular.  There is no murmur or rub.



CHEST:  Clear, symmetrical, unlabored.



ABDOMEN:  Soft, nontender.



EXTREMITIES:  No clubbing, cyanosis, edema.  Before the sedation was

increased she did have appropriate Babinski sign bilaterally.



PSYCHIATRIC:  Unable to assess.



DIAGNOSTICS:

Lab values are as follows - hematology obtained on 03/10/2019; WBCs are

10.9, hemoglobin is 11.6, hematocrit is 32.8, platelet count is 99,000.



Chemistry obtained on 03/10/2019; sodium is 140, potassium 4.0, chloride

is 111, carbon dioxide 20, BUN 8, creatinine 0.4, glucose 151, calcium is

8.7, magnesium is 2.1, AST 12, ALT is 52, total protein 5.5, albumin 3.1.



IMPRESSION AND PLAN:

1.  BILATERAL PNEUMONITIS, THIS COULD BE VIRAL VERSUS COMMUNITY ACQUIRED

VERSUS ASPIRATION ETIOLOGY.  The patient remains on broad spectrum

antibiotic coverage, including coverage for atypicals and anaerobes given

the severity of her illness.  Chest x-ray does show evidence of a left

lower lobe pneumonia.  Will follow.

2.  ACUTE HYPOXEMIC RESPIRATORY FAILURE.  The patient remains mechanically

intubated.  Do appreciate pulmonology's help with this.  The patient has

required a lot of sedation for ventilation compliance.  Will repeat chest

x-ray in the morning and follow.

3.  ALCOHOL USE WITH DELIRIUM TREMENS.  This does appear to be a

continuous dependency.  The patient has been supplement B vitamins.  She

remains on a Versed drip in addition to the propofol and did require a

couple of doses of valium as well.  She is much more comfortable at this

time.

4.  INVOLUNTARY MOVEMENTS.  Uncertain of the exact process of this. 

Nothing definite to appear to be seizure.  However, given the patient's

alcohol use, of course this is suspect.  The patient's magnesium is in

appropriate range but will go ahead and supplement some additional

magnesium.  Will obtain a CT of the head.  Will load her with Keppra and

obtain and EEG for the sake of completion.



CODE STATUS:

The patient is a full code.



DISPOSITION:

Depending on the patient's symptomatology and diagnostic findings will

reevaluate as needed.



TIME SPENT:

On this critical care visit, including assessment and plan, physical

examination, and review of records is 35 minutes.





DICTATING PHYSICIAN:  JONG YEPEZ NP





[id number]                  DT: 2019    0211

PHY#: [id number]            DD: 03/10/2019    1638

ID:   [id number]           JOB#: [id number]       ACCT: [id number]



cc:

>

## 2019-03-12 LAB
ADD MANUAL DIFF: NO
ALBUMIN SERPL-MCNC: 2.8 G/DL (ref 3.5–5)
ALP SERPL-CCNC: 49 U/L (ref 38–126)
ALT SERPL-CCNC: 15 U/L (ref 9–52)
ANION GAP SERPL CALC-SCNC: 7 MMOL/L (ref 5–19)
ARTERIAL BLOOD FIO2: (no result)
ARTERIAL BLOOD H2CO3: 1.12 MMOL/L (ref 1.05–1.35)
ARTERIAL BLOOD HCO3: 25.1 MMOL/L (ref 20–24)
ARTERIAL BLOOD PCO2: 37.3 MMHG (ref 35–45)
ARTERIAL BLOOD PH: 7.45 (ref 7.35–7.45)
ARTERIAL BLOOD PO2: 63.3 MMHG (ref 80–100)
ARTERIAL BLOOD TOTAL CO2: 26.3 MMOL/L (ref 21–25)
AST SERPL-CCNC: 12 U/L (ref 14–36)
BASE EXCESS BLDA CALC-SCNC: 1.2 MMOL/L
BASOPHILS # BLD AUTO: 0 10^3/UL (ref 0–0.2)
BASOPHILS NFR BLD AUTO: 0.3 % (ref 0–2)
BILIRUB DIRECT SERPL-MCNC: 0.3 MG/DL (ref 0–0.4)
BILIRUB SERPL-MCNC: 0.5 MG/DL (ref 0.2–1.3)
BUN SERPL-MCNC: 8 MG/DL (ref 7–20)
CALCIUM: 8.7 MG/DL (ref 8.4–10.2)
CHLORIDE SERPL-SCNC: 106 MMOL/L (ref 98–107)
CO2 SERPL-SCNC: 26 MMOL/L (ref 22–30)
EOSINOPHIL # BLD AUTO: 0.4 10^3/UL (ref 0–0.6)
EOSINOPHIL NFR BLD AUTO: 7.7 % (ref 0–6)
ERYTHROCYTE [DISTWIDTH] IN BLOOD BY AUTOMATED COUNT: 14.1 % (ref 11.5–14)
GLUCOSE SERPL-MCNC: 92 MG/DL (ref 75–110)
HCT VFR BLD CALC: 32.5 % (ref 36–47)
HGB BLD-MCNC: 10.9 G/DL (ref 12–15.5)
LYMPHOCYTES # BLD AUTO: 1.3 10^3/UL (ref 0.5–4.7)
LYMPHOCYTES NFR BLD AUTO: 25.7 % (ref 13–45)
MCH RBC QN AUTO: 30.4 PG (ref 27–33.4)
MCHC RBC AUTO-ENTMCNC: 33.7 G/DL (ref 32–36)
MCV RBC AUTO: 90 FL (ref 80–97)
MONOCYTES # BLD AUTO: 0.5 10^3/UL (ref 0.1–1.4)
MONOCYTES NFR BLD AUTO: 10.5 % (ref 3–13)
NEUTROPHILS # BLD AUTO: 2.8 10^3/UL (ref 1.7–8.2)
NEUTS SEG NFR BLD AUTO: 55.8 % (ref 42–78)
PLATELET # BLD: 102 10^3/UL (ref 150–450)
POTASSIUM SERPL-SCNC: 4.1 MMOL/L (ref 3.6–5)
PROT SERPL-MCNC: 5.3 G/DL (ref 6.3–8.2)
RBC # BLD AUTO: 3.6 10^6/UL (ref 3.72–5.28)
SAO2 % BLDA: 93.2 % (ref 94–98)
SODIUM SERPL-SCNC: 139.3 MMOL/L (ref 137–145)
TOTAL CELLS COUNTED % (AUTO): 100 %
WBC # BLD AUTO: 5.1 10^3/UL (ref 4–10.5)

## 2019-03-12 RX ADMIN — KETOROLAC TROMETHAMINE PRN MG: 30 INJECTION, SOLUTION INTRAMUSCULAR at 15:44

## 2019-03-12 RX ADMIN — PIPERACILLIN AND TAZOBACTAM SCH MLS/HR: 4; .5 INJECTION, POWDER, LYOPHILIZED, FOR SOLUTION INTRAVENOUS; PARENTERAL at 05:52

## 2019-03-12 RX ADMIN — IPRATROPIUM BROMIDE SCH MG: 0.5 SOLUTION RESPIRATORY (INHALATION) at 16:56

## 2019-03-12 RX ADMIN — Medication SCH MG: at 10:47

## 2019-03-12 RX ADMIN — PROPOFOL PRN MLS/HR: 10 INJECTION, EMULSION INTRAVENOUS at 15:44

## 2019-03-12 RX ADMIN — BUDESONIDE SCH MG: 0.5 SUSPENSION RESPIRATORY (INHALATION) at 08:57

## 2019-03-12 RX ADMIN — LEVALBUTEROL HYDROCHLORIDE SCH MG: 1.25 SOLUTION RESPIRATORY (INHALATION) at 08:57

## 2019-03-12 RX ADMIN — FOLIC ACID SCH MG: 1 TABLET ORAL at 10:47

## 2019-03-12 RX ADMIN — MIDAZOLAM HYDROCHLORIDE PRN MLS/HR: 5 INJECTION, SOLUTION INTRAMUSCULAR; INTRAVENOUS at 05:52

## 2019-03-12 RX ADMIN — LEVETIRACETAM SCH MLS/HR: 10 INJECTION INTRAVENOUS at 21:25

## 2019-03-12 RX ADMIN — PIPERACILLIN AND TAZOBACTAM SCH MLS/HR: 4; .5 INJECTION, POWDER, LYOPHILIZED, FOR SOLUTION INTRAVENOUS; PARENTERAL at 00:28

## 2019-03-12 RX ADMIN — DOXYCYCLINE SCH MLS/HR: 100 INJECTION, POWDER, LYOPHILIZED, FOR SOLUTION INTRAVENOUS at 21:26

## 2019-03-12 RX ADMIN — PROPOFOL PRN MLS/HR: 10 INJECTION, EMULSION INTRAVENOUS at 08:35

## 2019-03-12 RX ADMIN — Medication SCH: at 23:39

## 2019-03-12 RX ADMIN — LEVETIRACETAM SCH MLS/HR: 10 INJECTION INTRAVENOUS at 10:52

## 2019-03-12 RX ADMIN — IPRATROPIUM BROMIDE SCH MG: 0.5 SOLUTION RESPIRATORY (INHALATION) at 08:57

## 2019-03-12 RX ADMIN — PROPOFOL PRN MLS/HR: 10 INJECTION, EMULSION INTRAVENOUS at 02:10

## 2019-03-12 RX ADMIN — MIDAZOLAM HYDROCHLORIDE PRN MLS/HR: 5 INJECTION, SOLUTION INTRAMUSCULAR; INTRAVENOUS at 15:43

## 2019-03-12 RX ADMIN — VALPROATE SODIUM SCH MLS/HR: 100 INJECTION, SOLUTION INTRAVENOUS at 10:46

## 2019-03-12 RX ADMIN — PIPERACILLIN AND TAZOBACTAM SCH MLS/HR: 4; .5 INJECTION, POWDER, LYOPHILIZED, FOR SOLUTION INTRAVENOUS; PARENTERAL at 14:13

## 2019-03-12 RX ADMIN — FONDAPARINUX SODIUM SCH MG: 2.5 INJECTION, SOLUTION SUBCUTANEOUS at 08:34

## 2019-03-12 RX ADMIN — PROPOFOL PRN MLS/HR: 10 INJECTION, EMULSION INTRAVENOUS at 21:25

## 2019-03-12 RX ADMIN — LEVALBUTEROL HYDROCHLORIDE SCH MG: 1.25 SOLUTION RESPIRATORY (INHALATION) at 00:24

## 2019-03-12 RX ADMIN — DEXTROSE, SODIUM CHLORIDE, AND POTASSIUM CHLORIDE PRN MLS/HR: 5; .45; .15 INJECTION INTRAVENOUS at 05:52

## 2019-03-12 RX ADMIN — LEVALBUTEROL HYDROCHLORIDE SCH MG: 1.25 SOLUTION RESPIRATORY (INHALATION) at 16:56

## 2019-03-12 RX ADMIN — Medication SCH: at 14:13

## 2019-03-12 RX ADMIN — IPRATROPIUM BROMIDE SCH MG: 0.5 SOLUTION RESPIRATORY (INHALATION) at 00:24

## 2019-03-12 RX ADMIN — PIPERACILLIN AND TAZOBACTAM SCH MLS/HR: 4; .5 INJECTION, POWDER, LYOPHILIZED, FOR SOLUTION INTRAVENOUS; PARENTERAL at 17:56

## 2019-03-12 RX ADMIN — BUDESONIDE SCH MG: 0.5 SUSPENSION RESPIRATORY (INHALATION) at 19:47

## 2019-03-12 RX ADMIN — MIDAZOLAM HYDROCHLORIDE PRN MLS/HR: 5 INJECTION, SOLUTION INTRAMUSCULAR; INTRAVENOUS at 00:28

## 2019-03-12 RX ADMIN — DOXYCYCLINE SCH MLS/HR: 100 INJECTION, POWDER, LYOPHILIZED, FOR SOLUTION INTRAVENOUS at 10:46

## 2019-03-12 RX ADMIN — VALPROATE SODIUM SCH MLS/HR: 100 INJECTION, SOLUTION INTRAVENOUS at 21:26

## 2019-03-12 RX ADMIN — DEXTROSE, SODIUM CHLORIDE, AND POTASSIUM CHLORIDE PRN MLS/HR: 5; .45; .15 INJECTION INTRAVENOUS at 17:55

## 2019-03-12 RX ADMIN — Medication SCH ML: at 05:53

## 2019-03-12 NOTE — EEG PRO FEE REPORT
EEG INTERPRETATION



PATIENT NAME: NIKOS FERGUSON

MRN: [id number]      ACCT #:  [id number]   ROOM#:  611

ORDER#: [id number]

DATE OF STUDY:  2019                    : 1990

REFERRING MD:  JONG YEPEZ NP







REASON FOR STUDY:  Involuntary movement



MEDICATIONS:  Piperacillin/Tazobactam, Thiamine, Zofran, Versed, Diprivan,

Tylenol, Albuterol, Fentanyl, Folic acid, Fondaparinux, Atrovent, Toradol,

Xopenex, Keppra, Lopressor, Protonix







History

This is a 28 year old female with a history of cardiac disorders, heart

murmurs, GI disorder, ulcer, GERD, ethanol use who presented with

respiratory failure.  This EEG was requested for evaluation of

epileptiform activity due involuntary movements.









EEG Interpretation

This EEG was recorded on an intubated patient.  The predominant EEG

pattern consisted of nearly continuous generalized {or fragmentary

generalized} spike wave activity occurring at approximately 1.5 Hz.  The

remainder of the background consisted of predominantly low amplitude

intermixed 4-10 Hz theta-alpha activity.  There was minimal generalized

beta activity.  The EEG is symmetric in amplitudes and frequencies.  There

was no sleep architecture.  There were no apparent spontaneous eye

openings or closings, but the EEG tech manually opened and closed the

patients eyes multiple times.  There was no reactivity to the EEG with

passive eye opening and closure.



Photic stimulation resulted in no photic driving.



The EKG showed a regular rhythm with typically 80-90 bpm.



EEG Impression

This EEG is markedly abnormal, and most concerning for non-convulsive

status-epilepticus.  Clinical correlation is needed, and treatment for

non-convulsive status-epilepticus is recommended.  Would consider a repeat

EEG after appropriate treatment if clinically indicated.



These EEG findings were verbally relayed to the covering physician Dr Kiana Pratt on 2019 at 18:03 EST.







INTERPRETING PHYSICIAN: JOSH SALGUERO M.D.





/:  MTEFFT     DT:  2019 TT:  0741      ID:  [id number]

/:         DD:  2019 TD:  1807     JOB:  [id number]



cc:GRANT WARMOUTH, M.D. PAUL WEILAND, M.D. MICHAEL KENNEDY, NP

>





MTDD

## 2019-03-12 NOTE — RADIOLOGY REPORT (SQ)
EXAM DESCRIPTION:  CHEST SINGLE VIEW



COMPLETED DATE/TIME:  3/12/2019 10:35 am



REASON FOR STUDY:  shortness of breath



COMPARISON:  3/11/2019.



FINDINGS:  Single-view chest AP semi upright.  Timed approximately 1005 hours in PACs.

Endotracheal and nasogastric tubes in place, appropriate.  External artifacts from various leads.

Persistent diminished aeration left base, either consolidation or volume loss with mild left effusion
.

Streaky areas of subsegmental atelectasis are further suggested in the upper lobes.



TECHNICAL DOCUMENTATION:  JOB ID:  [id number]



Reading location - IP/workstation name: [email or web address]

## 2019-03-12 NOTE — PDOC PROGRESS REPORT
Subjective


Progress Note for:: 03/12/19


Subjective:: 





 28 years old  female patient presents to [facility] ER 

with chief complaint of shortness of breath which is getting worse 

progressively.  Patient initially tried to use BiPAP but shortness of breath 

failed to respond and her condition deteriorated which required emergency 

intubation and transfer to ICU.  This morning I seen patient intubated.  She is 

now on pressure support and scheduled for weaning trial.





[birth year]-01-opos-old female admitted for shortness of breath not responded well

to BiPAP she was intubated.  We are trying to taper off the sedation today to 

start weaning process.  No acute events in the last 24 hours.  Patient is 

afebrile.  EEG was done yesterday the neurologist assessment is nonconvulsive 

status epilepticus in the EEG report recommendation is to add a Depakote to the 

present medication.








Reason For Visit: 


ACUTE RESPIRATORY FAILURE WITH HYPOXIA








Physical Exam


Vital Signs: 


                                        











Temp Pulse Resp BP Pulse Ox


 


 97.5 F   68   19   104/50 L  97 


 


 03/12/19 06:30  03/12/19 08:59  03/12/19 08:59  03/12/19 06:26  03/12/19 08:59








                                 Intake & Output











 03/11/19 03/12/19 03/13/19





 06:59 06:59 06:59


 


Intake Total 4655 4374 142


 


Output Total 3475 3825 


 


Balance 1180 549 142


 


Weight 65 kg 66.3 kg 











General appearance: PRESENT: no acute distress, other - Patient is still 

intubated under sedation.


Head exam: PRESENT: atraumatic


Eye exam: PRESENT: PERRLA


Mouth exam: PRESENT: moist, tongue midline


Neck exam: ABSENT: carotid bruit, JVD, lymphadenopathy, thyromegaly


Respiratory exam: PRESENT: decreased breath sounds


Cardiovascular exam: PRESENT: RRR.  ABSENT: diastolic murmur, rubs, systolic 

murmur


GI/Abdominal exam: PRESENT: normal bowel sounds, soft.  ABSENT: distended, 

guarding, mass, organolmegaly, rebound, tenderness


Extremities exam: PRESENT: full ROM.  ABSENT: calf tenderness, clubbing, pedal 

edema


Neurological exam: PRESENT: other - Shunt is still intubated under sedation 

unable to to neurological evaluation.





Results


Laboratory Results: 


                                        





                                 03/11/19 04:28 





                                 03/11/19 04:28 





                                        





03/10/19 21:20   Sputum   Gram Stain - Final


03/10/19 21:20   Sputum   Sputum Culture - Final


                            NO GROWTH 2 DAYS





                                        











  03/08/19 03/08/19 03/08/19





  00:39 00:39 06:40


 


Creatine Kinase  39   Cancelled


 


CK-MB (CK-2)   < 0.22 


 


Troponin I   < 0.012 


 


NT-Pro-B Natriuret Pep   














  03/08/19 03/08/19 03/08/19





  06:40 07:05 07:05


 


Creatine Kinase    29 L


 


CK-MB (CK-2)  Cancelled  < 0.22 


 


Troponin I  Cancelled  < 0.012 


 


NT-Pro-B Natriuret Pep   














  03/08/19 03/08/19 03/09/19





  12:36 12:36 03:45


 


Creatine Kinase  38  


 


CK-MB (CK-2)   0.82 


 


Troponin I   < 0.012 


 


NT-Pro-B Natriuret Pep    358 H











Impressions: 


                                        





Chest/Abdomen CTA  03/08/19 01:41


IMPRESSION:


 


Negative for pulmonary embolus.


 


Perihilar groundglass opacities bilaterally consistent with


pneumonitis


 


TECHNICAL DOCUMENTATION:


 


Quality ID # 436: Final reports with documentation of one or more


dose reduction techniques (e.g., Automated exposure control,


adjustment of the mA and/or kV according to patient size, use of


iterative reconstruction technique)


 


copyright 2011 [employer]- All Rights Reserved


 








Head CT  03/10/19 00:00


IMPRESSION:  NORMAL BRAIN CT WITHOUT CONTRAST.


EVIDENCE OF ACUTE STROKE: NO.


 














Assessment & Plan





- Diagnosis


(1) Acute respiratory failure with hypoxia


Is this a current diagnosis for this admission?: Yes   


Plan: 


3/12/2019-CT chest done on 3/8/2019 suggestive of bilateral groundglass 

opacifications suggestive of pneumonitis.  Blood cultures and urine cultures are

negative.  She did is presently on doxycycline and Zosyn.  Plan is to continue 

the present management chest x-ray today is suggestive of chronic left base 

consolidation.  plan Is to continue the present management.  We started the 

weaning process today hopefully we can extubate her by tomorrow.  ABG done today

on 40% oxygen with pH of 7.4 PCO2 39 PO2 63.3 bicarb is 23.








(2) Pneumonia


Is this a current diagnosis for this admission?: Yes   


Plan: 


3/12/2019-CT chest done on 3/8/2019 suggestive of bilateral opacifications 

suggesting pneumonitis.  Presently on doxycycline and IV Zosyn.  Plan is to 

continue the present management probably try to extubate her today or tomorrow. 

Blood cultures urine cultures are negative so far.  Pneumonia may be secondary 

to gram-negative bacteria it can be due to aspiration pneumonia.








(3) Tobacco dependence


Is this a current diagnosis for this admission?: Yes   


Plan: 


3/12/2019 history of tobacco dependency to place her on nicotine patch 21 mcg 

daily.








- Time


Time Spent with patient: 15-24 minutes


Medications reviewed and adjusted accordingly: Yes


Anticipated discharge: Home

## 2019-03-13 LAB
ADD MANUAL DIFF: NO
ALBUMIN SERPL-MCNC: 2.7 G/DL (ref 3.5–5)
ALP SERPL-CCNC: 43 U/L (ref 38–126)
ALT SERPL-CCNC: 9 U/L (ref 9–52)
ANION GAP SERPL CALC-SCNC: 6 MMOL/L (ref 5–19)
ARTERIAL BLOOD FIO2: (no result)
ARTERIAL BLOOD H2CO3: 1.19 MMOL/L (ref 1.05–1.35)
ARTERIAL BLOOD HCO3: 27.5 MMOL/L (ref 20–24)
ARTERIAL BLOOD PCO2: 39.4 MMHG (ref 35–45)
ARTERIAL BLOOD PH: 7.46 (ref 7.35–7.45)
ARTERIAL BLOOD PO2: 155.5 MMHG (ref 80–100)
ARTERIAL BLOOD TOTAL CO2: 28.7 MMOL/L (ref 21–25)
AST SERPL-CCNC: 11 U/L (ref 14–36)
BASE EXCESS BLDA CALC-SCNC: 3.4 MMOL/L
BASOPHILS # BLD AUTO: 0 10^3/UL (ref 0–0.2)
BASOPHILS NFR BLD AUTO: 0.4 % (ref 0–2)
BILIRUB DIRECT SERPL-MCNC: 0.4 MG/DL (ref 0–0.4)
BILIRUB SERPL-MCNC: 0.4 MG/DL (ref 0.2–1.3)
BUN SERPL-MCNC: 9 MG/DL (ref 7–20)
CALCIUM: 8.6 MG/DL (ref 8.4–10.2)
CHLORIDE SERPL-SCNC: 107 MMOL/L (ref 98–107)
CO2 SERPL-SCNC: 26 MMOL/L (ref 22–30)
EOSINOPHIL # BLD AUTO: 0.5 10^3/UL (ref 0–0.6)
EOSINOPHIL NFR BLD AUTO: 10.3 % (ref 0–6)
ERYTHROCYTE [DISTWIDTH] IN BLOOD BY AUTOMATED COUNT: 13.8 % (ref 11.5–14)
GLUCOSE SERPL-MCNC: 110 MG/DL (ref 75–110)
HCT VFR BLD CALC: 29.4 % (ref 36–47)
HGB BLD-MCNC: 10 G/DL (ref 12–15.5)
LYMPHOCYTES # BLD AUTO: 1.4 10^3/UL (ref 0.5–4.7)
LYMPHOCYTES NFR BLD AUTO: 30.3 % (ref 13–45)
MCH RBC QN AUTO: 30.5 PG (ref 27–33.4)
MCHC RBC AUTO-ENTMCNC: 34.2 G/DL (ref 32–36)
MCV RBC AUTO: 89 FL (ref 80–97)
MONOCYTES # BLD AUTO: 0.4 10^3/UL (ref 0.1–1.4)
MONOCYTES NFR BLD AUTO: 9.2 % (ref 3–13)
NEUTROPHILS # BLD AUTO: 2.2 10^3/UL (ref 1.7–8.2)
NEUTS SEG NFR BLD AUTO: 49.8 % (ref 42–78)
PLATELET # BLD: 107 10^3/UL (ref 150–450)
POTASSIUM SERPL-SCNC: 4 MMOL/L (ref 3.6–5)
PROT SERPL-MCNC: 5 G/DL (ref 6.3–8.2)
RBC # BLD AUTO: 3.29 10^6/UL (ref 3.72–5.28)
SAO2 % BLDA: 99.1 % (ref 94–98)
SODIUM SERPL-SCNC: 138.8 MMOL/L (ref 137–145)
TOTAL CELLS COUNTED % (AUTO): 100 %
WBC # BLD AUTO: 4.5 10^3/UL (ref 4–10.5)

## 2019-03-13 RX ADMIN — DEXTROSE, SODIUM CHLORIDE, AND POTASSIUM CHLORIDE PRN MLS/HR: 5; .45; .15 INJECTION INTRAVENOUS at 16:00

## 2019-03-13 RX ADMIN — LEVETIRACETAM SCH MLS/HR: 10 INJECTION INTRAVENOUS at 09:54

## 2019-03-13 RX ADMIN — BUDESONIDE SCH MG: 0.5 SUSPENSION RESPIRATORY (INHALATION) at 07:42

## 2019-03-13 RX ADMIN — DEXAMETHASONE SODIUM PHOSPHATE PRN MG: 10 INJECTION INTRAMUSCULAR; INTRAVENOUS at 12:34

## 2019-03-13 RX ADMIN — PIPERACILLIN AND TAZOBACTAM SCH MLS/HR: 4; .5 INJECTION, POWDER, LYOPHILIZED, FOR SOLUTION INTRAVENOUS; PARENTERAL at 01:01

## 2019-03-13 RX ADMIN — BUDESONIDE SCH MG: 0.5 SUSPENSION RESPIRATORY (INHALATION) at 20:55

## 2019-03-13 RX ADMIN — FONDAPARINUX SODIUM SCH MG: 2.5 INJECTION, SOLUTION SUBCUTANEOUS at 08:54

## 2019-03-13 RX ADMIN — FOLIC ACID SCH MG: 1 TABLET ORAL at 09:55

## 2019-03-13 RX ADMIN — LEVALBUTEROL HYDROCHLORIDE SCH MG: 1.25 SOLUTION RESPIRATORY (INHALATION) at 16:35

## 2019-03-13 RX ADMIN — Medication SCH ML: at 21:09

## 2019-03-13 RX ADMIN — NICOTINE SCH EACH: 21 PATCH, EXTENDED RELEASE TOPICAL at 09:54

## 2019-03-13 RX ADMIN — KETOROLAC TROMETHAMINE PRN MG: 30 INJECTION, SOLUTION INTRAMUSCULAR at 13:29

## 2019-03-13 RX ADMIN — Medication SCH ML: at 05:18

## 2019-03-13 RX ADMIN — VALPROATE SODIUM SCH MLS/HR: 100 INJECTION, SOLUTION INTRAVENOUS at 09:54

## 2019-03-13 RX ADMIN — VALPROATE SODIUM SCH MLS/HR: 100 INJECTION, SOLUTION INTRAVENOUS at 21:05

## 2019-03-13 RX ADMIN — PROPOFOL PRN MLS/HR: 10 INJECTION, EMULSION INTRAVENOUS at 03:07

## 2019-03-13 RX ADMIN — IPRATROPIUM BROMIDE SCH MG: 0.5 SOLUTION RESPIRATORY (INHALATION) at 00:29

## 2019-03-13 RX ADMIN — MIDAZOLAM HYDROCHLORIDE PRN MLS/HR: 5 INJECTION, SOLUTION INTRAMUSCULAR; INTRAVENOUS at 01:58

## 2019-03-13 RX ADMIN — DOXYCYCLINE SCH MLS/HR: 100 INJECTION, POWDER, LYOPHILIZED, FOR SOLUTION INTRAVENOUS at 09:54

## 2019-03-13 RX ADMIN — Medication SCH: at 16:42

## 2019-03-13 RX ADMIN — DEXTROSE, SODIUM CHLORIDE, AND POTASSIUM CHLORIDE PRN MLS/HR: 5; .45; .15 INJECTION INTRAVENOUS at 05:19

## 2019-03-13 RX ADMIN — LEVALBUTEROL HYDROCHLORIDE SCH MG: 1.25 SOLUTION RESPIRATORY (INHALATION) at 07:42

## 2019-03-13 RX ADMIN — LEVETIRACETAM SCH MLS/HR: 10 INJECTION INTRAVENOUS at 21:05

## 2019-03-13 RX ADMIN — PIPERACILLIN AND TAZOBACTAM SCH MLS/HR: 4; .5 INJECTION, POWDER, LYOPHILIZED, FOR SOLUTION INTRAVENOUS; PARENTERAL at 05:18

## 2019-03-13 RX ADMIN — IPRATROPIUM BROMIDE SCH MG: 0.5 SOLUTION RESPIRATORY (INHALATION) at 16:35

## 2019-03-13 RX ADMIN — DOXYCYCLINE SCH MLS/HR: 100 INJECTION, POWDER, LYOPHILIZED, FOR SOLUTION INTRAVENOUS at 21:09

## 2019-03-13 RX ADMIN — Medication SCH MG: at 09:55

## 2019-03-13 RX ADMIN — IPRATROPIUM BROMIDE SCH MG: 0.5 SOLUTION RESPIRATORY (INHALATION) at 07:42

## 2019-03-13 RX ADMIN — DEXAMETHASONE SODIUM PHOSPHATE PRN MG: 10 INJECTION INTRAMUSCULAR; INTRAVENOUS at 18:05

## 2019-03-13 RX ADMIN — LEVALBUTEROL HYDROCHLORIDE SCH MG: 1.25 SOLUTION RESPIRATORY (INHALATION) at 00:29

## 2019-03-13 RX ADMIN — KETOROLAC TROMETHAMINE PRN MG: 30 INJECTION, SOLUTION INTRAMUSCULAR at 21:04

## 2019-03-13 NOTE — RADIOLOGY REPORT (SQ)
EXAM DESCRIPTION:  CHEST SINGLE VIEW



COMPLETED DATE/TIME:  3/13/2019 12:57 pm



REASON FOR STUDY:  possible aspiration



COMPARISON:  3/13/2019 at 0610 hours.



EXAM PARAMETERS:  NUMBER OF VIEWS: One view.

TECHNIQUE: Single frontal radiographic view of the chest acquired.

RADIATION DOSE: NA

LIMITATIONS: None.



FINDINGS:  LUNGS AND PLEURA: Patchy airspace disease particularly in the right upper lobe and left lo
wer lobe.

MEDIASTINUM AND HILAR STRUCTURES: No masses.  Contour normal.

HEART AND VASCULAR STRUCTURES: Heart normal in size.  Normal vasculature.

BONES: No acute findings.

HARDWARE: Endotracheal tube and nasogastric tube unchanged.

OTHER: No other significant finding.



IMPRESSION:  NO CHANGE IN APPEARANCE OF THE CHEST.



TECHNICAL DOCUMENTATION:  JOB ID:  [id number]

 2011 [employer]- All Rights Reserved



Reading location - IP/workstation name: [email or web address]

## 2019-03-13 NOTE — PDOC PROGRESS REPORT
Subjective


Progress Note for:: 03/13/19


Subjective:: 


Patient remained intubated.  She is potential for weaning trial and later 

extubation.  Her EEG read by telemetry neurologist as worrisome for 

nonconvulsive status epilepticus and he recommended to add Depakote on top of 

the Keppra.  He also recommended to repeat the EEG.





Reason For Visit: 


ACUTE RESPIRATORY FAILURE WITH HYPOXIA








Physical Exam


Vital Signs: 


                                        











Temp Pulse Resp BP Pulse Ox


 


 97.9 F   89   19   146/94 H  99 


 


 03/13/19 12:00  03/13/19 12:00  03/13/19 12:00  03/13/19 12:00  03/13/19 12:11








                                 Intake & Output











 03/12/19 03/13/19 03/14/19





 06:59 06:59 06:59


 


Intake Total 4374 3955 472


 


Output Total 3825 4049 920


 


Balance 549 -94 -448


 


Weight 66.3 kg 64.6 kg 











General appearance: PRESENT: no acute distress


Eye exam: PRESENT: conjunctiva pink


Mouth exam: PRESENT: moist


Respiratory exam: PRESENT: clear to auscultation lan.  ABSENT: rales, rhonchi, 

wheezes


Cardiovascular exam: PRESENT: RRR.  ABSENT: diastolic murmur, rubs, systolic 

murmur


Psychiatric exam: PRESENT: other - Patient responds to noxious stimuli 

appropriately.





Results


Laboratory Results: 


                                        





                                 03/13/19 03:42 





                                 03/13/19 03:42 





                                        











  03/13/19 03/13/19 03/13/19





  03:42 03:42 04:39


 


WBC  4.5  


 


RBC  3.29 L  


 


Hgb  10.0 L  


 


Hct  29.4 L  


 


MCV  89  


 


MCH  30.5  


 


MCHC  34.2  


 


RDW  13.8  


 


Plt Count  107 L  


 


Seg Neutrophils %  49.8  


 


Lymphocytes %  30.3  


 


Monocytes %  9.2  


 


Eosinophils %  10.3 H  


 


Basophils %  0.4  


 


Absolute Neutrophils  2.2  


 


Absolute Lymphocytes  1.4  


 


Absolute Monocytes  0.4  


 


Absolute Eosinophils  0.5  


 


Absolute Basophils  0.0  


 


Carbonic Acid    1.19


 


HCO3/H2CO3 Ratio    23:1


 


ABG pH    7.46 H


 


ABG pCO2    39.4


 


ABG pO2    155.5 H


 


ABG HCO3    27.5 H


 


ABG O2 Saturation    99.1 H


 


ABG Base Excess    3.4


 


FiO2    45%


 


Sodium   138.8 


 


Potassium   4.0 


 


Chloride   107 


 


Carbon Dioxide   26 


 


Anion Gap   6 


 


BUN   9 


 


Creatinine   0.42 L 


 


Est GFR ( Amer)   > 60 


 


Est GFR (Non-Af Amer)   > 60 


 


Glucose   110 


 


Calcium   8.6 


 


Magnesium   2.1 


 


Total Bilirubin   0.4 


 


AST   11 L 


 


ALT   9 


 


Alkaline Phosphatase   43 


 


Total Protein   5.0 L 


 


Albumin   2.7 L 








                                        





03/10/19 21:20   Sputum   Gram Stain - Final


03/10/19 21:20   Sputum   Sputum Culture - Final


                            NO GROWTH 2 DAYS





                                        











  03/08/19 03/08/19 03/08/19





  00:39 00:39 06:40


 


Creatine Kinase  39   Cancelled


 


CK-MB (CK-2)   < 0.22 


 


Troponin I   < 0.012 


 


NT-Pro-B Natriuret Pep   














  03/08/19 03/08/19 03/08/19





  06:40 07:05 07:05


 


Creatine Kinase    29 L


 


CK-MB (CK-2)  Cancelled  < 0.22 


 


Troponin I  Cancelled  < 0.012 


 


NT-Pro-B Natriuret Pep   














  03/08/19 03/08/19 03/09/19





  12:36 12:36 03:45


 


Creatine Kinase  38  


 


CK-MB (CK-2)   0.82 


 


Troponin I   < 0.012 


 


NT-Pro-B Natriuret Pep    358 H











Impressions: 


                                        





Chest/Abdomen CTA  03/08/19 01:41


IMPRESSION:


 


Negative for pulmonary embolus.


 


Perihilar groundglass opacities bilaterally consistent with


pneumonitis


 


TECHNICAL DOCUMENTATION:


 


Quality ID # 436: Final reports with documentation of one or more


dose reduction techniques (e.g., Automated exposure control,


adjustment of the mA and/or kV according to patient size, use of


iterative reconstruction technique)


 


copyright 2011 [employer]- All Rights Reserved


 








Head CT  03/10/19 00:00


IMPRESSION:  NORMAL BRAIN CT WITHOUT CONTRAST.


EVIDENCE OF ACUTE STROKE: NO.


 








Chest X-Ray  03/13/19 12:29


IMPRESSION:  NO CHANGE IN APPEARANCE OF THE CHEST.


 














Assessment & Plan





- Diagnosis


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


Is this a current diagnosis for this admission?: Yes   


Plan: 


Continue Keppra and Depakote.  I will repeat EEG as recommended by neurologist.








(2) Acute respiratory failure with hypoxia


Is this a current diagnosis for this admission?: Yes   


Plan: 


Patient scheduled for weaning trial and later extubation.








(3) Dyspnea and respiratory abnormalities


Is this a current diagnosis for this admission?: Yes   


Plan: 


Continue current regimen








(4) Tobacco dependence


Is this a current diagnosis for this admission?: Yes   


Plan: 


Patient will be counseled and encouraged to quit smoking, and she fully awake 

alert and oriented.








(5) GERD (gastroesophageal reflux disease)


Qualifiers: 


   Esophagitis presence: without esophagitis   Qualified Code(s): K21.9 - 

Gastro-esophageal reflux disease without esophagitis   


Is this a current diagnosis for this admission?: Yes   


Plan: 


Continue PPI.

## 2019-03-13 NOTE — RADIOLOGY REPORT (SQ)
EXAM DESCRIPTION:  CHEST SINGLE VIEW



COMPLETED DATE/TIME:  3/13/2019 6:45 am



REASON FOR STUDY:  resp failure



COMPARISON:  3/12/2019.



FINDINGS:  Single-view chest AP portable semi upright 0610 hours.

Slightly improved aeration globally.

Appropriate lines and tubes including endotracheal and nasogastric.



TECHNICAL DOCUMENTATION:  JOB ID:  [id number]



Reading location - IP/workstation name: [email or web address]

## 2019-03-14 LAB
ADD MANUAL DIFF: NO
ALBUMIN SERPL-MCNC: 3.4 G/DL (ref 3.5–5)
ALP SERPL-CCNC: 55 U/L (ref 38–126)
ALT SERPL-CCNC: 13 U/L (ref 9–52)
ANION GAP SERPL CALC-SCNC: 7 MMOL/L (ref 5–19)
ARTERIAL BLOOD FIO2: (no result)
ARTERIAL BLOOD H2CO3: 1.11 MMOL/L (ref 1.05–1.35)
ARTERIAL BLOOD HCO3: 26.5 MMOL/L (ref 20–24)
ARTERIAL BLOOD PCO2: 36.8 MMHG (ref 35–45)
ARTERIAL BLOOD PH: 7.48 (ref 7.35–7.45)
ARTERIAL BLOOD PO2: 88.2 MMHG (ref 80–100)
ARTERIAL BLOOD TOTAL CO2: 27.6 MMOL/L (ref 21–25)
AST SERPL-CCNC: 17 U/L (ref 14–36)
BASE EXCESS BLDA CALC-SCNC: 3 MMOL/L
BASOPHILS # BLD AUTO: 0 10^3/UL (ref 0–0.2)
BASOPHILS NFR BLD AUTO: 0.4 % (ref 0–2)
BILIRUB DIRECT SERPL-MCNC: 0.4 MG/DL (ref 0–0.4)
BILIRUB SERPL-MCNC: 0.7 MG/DL (ref 0.2–1.3)
BUN SERPL-MCNC: 8 MG/DL (ref 7–20)
CALCIUM: 9.2 MG/DL (ref 8.4–10.2)
CHLORIDE SERPL-SCNC: 107 MMOL/L (ref 98–107)
CO2 SERPL-SCNC: 26 MMOL/L (ref 22–30)
EOSINOPHIL # BLD AUTO: 0.5 10^3/UL (ref 0–0.6)
EOSINOPHIL NFR BLD AUTO: 7.6 % (ref 0–6)
ERYTHROCYTE [DISTWIDTH] IN BLOOD BY AUTOMATED COUNT: 13.6 % (ref 11.5–14)
GLUCOSE SERPL-MCNC: 95 MG/DL (ref 75–110)
HCT VFR BLD CALC: 34.5 % (ref 36–47)
HGB BLD-MCNC: 11.9 G/DL (ref 12–15.5)
LYMPHOCYTES # BLD AUTO: 1.5 10^3/UL (ref 0.5–4.7)
LYMPHOCYTES NFR BLD AUTO: 22.1 % (ref 13–45)
MCH RBC QN AUTO: 30.7 PG (ref 27–33.4)
MCHC RBC AUTO-ENTMCNC: 34.6 G/DL (ref 32–36)
MCV RBC AUTO: 89 FL (ref 80–97)
MONOCYTES # BLD AUTO: 0.6 10^3/UL (ref 0.1–1.4)
MONOCYTES NFR BLD AUTO: 8.9 % (ref 3–13)
NEUTROPHILS # BLD AUTO: 4 10^3/UL (ref 1.7–8.2)
NEUTS SEG NFR BLD AUTO: 61 % (ref 42–78)
PHOSPHATE SERPL-MCNC: 3.8 MG/DL (ref 2.5–4.5)
PLATELET # BLD: 138 10^3/UL (ref 150–450)
POTASSIUM SERPL-SCNC: 3.7 MMOL/L (ref 3.6–5)
PROT SERPL-MCNC: 6.2 G/DL (ref 6.3–8.2)
RBC # BLD AUTO: 3.89 10^6/UL (ref 3.72–5.28)
SAO2 % BLDA: 97.3 % (ref 94–98)
SODIUM SERPL-SCNC: 140 MMOL/L (ref 137–145)
TOTAL CELLS COUNTED % (AUTO): 100 %
WBC # BLD AUTO: 6.6 10^3/UL (ref 4–10.5)

## 2019-03-14 RX ADMIN — LEVETIRACETAM SCH MLS/HR: 10 INJECTION INTRAVENOUS at 22:31

## 2019-03-14 RX ADMIN — IPRATROPIUM BROMIDE SCH MG: 0.5 SOLUTION RESPIRATORY (INHALATION) at 00:24

## 2019-03-14 RX ADMIN — DOXYCYCLINE SCH MLS/HR: 100 INJECTION, POWDER, LYOPHILIZED, FOR SOLUTION INTRAVENOUS at 23:26

## 2019-03-14 RX ADMIN — KETOROLAC TROMETHAMINE PRN MG: 30 INJECTION, SOLUTION INTRAMUSCULAR at 23:28

## 2019-03-14 RX ADMIN — Medication SCH MG: at 11:45

## 2019-03-14 RX ADMIN — VALPROATE SODIUM SCH MLS/HR: 100 INJECTION, SOLUTION INTRAVENOUS at 11:42

## 2019-03-14 RX ADMIN — IPRATROPIUM BROMIDE SCH MG: 0.5 SOLUTION RESPIRATORY (INHALATION) at 16:42

## 2019-03-14 RX ADMIN — FONDAPARINUX SODIUM SCH MG: 2.5 INJECTION, SOLUTION SUBCUTANEOUS at 08:24

## 2019-03-14 RX ADMIN — VALPROATE SODIUM SCH MLS/HR: 100 INJECTION, SOLUTION INTRAVENOUS at 21:10

## 2019-03-14 RX ADMIN — DOXYCYCLINE SCH MLS/HR: 100 INJECTION, POWDER, LYOPHILIZED, FOR SOLUTION INTRAVENOUS at 11:43

## 2019-03-14 RX ADMIN — BUDESONIDE SCH MG: 0.5 SUSPENSION RESPIRATORY (INHALATION) at 08:36

## 2019-03-14 RX ADMIN — Medication SCH ML: at 21:12

## 2019-03-14 RX ADMIN — LEVALBUTEROL HYDROCHLORIDE SCH MG: 1.25 SOLUTION RESPIRATORY (INHALATION) at 16:42

## 2019-03-14 RX ADMIN — DEXTROSE, SODIUM CHLORIDE, AND POTASSIUM CHLORIDE PRN MLS/HR: 5; .45; .15 INJECTION INTRAVENOUS at 01:59

## 2019-03-14 RX ADMIN — LEVALBUTEROL HYDROCHLORIDE SCH MG: 1.25 SOLUTION RESPIRATORY (INHALATION) at 08:36

## 2019-03-14 RX ADMIN — LEVETIRACETAM SCH MLS/HR: 10 INJECTION INTRAVENOUS at 11:43

## 2019-03-14 RX ADMIN — KETOROLAC TROMETHAMINE PRN MG: 30 INJECTION, SOLUTION INTRAMUSCULAR at 08:21

## 2019-03-14 RX ADMIN — BUDESONIDE SCH MG: 0.5 SUSPENSION RESPIRATORY (INHALATION) at 20:10

## 2019-03-14 RX ADMIN — DEXTROSE, SODIUM CHLORIDE, AND POTASSIUM CHLORIDE PRN MLS/HR: 5; .45; .15 INJECTION INTRAVENOUS at 12:13

## 2019-03-14 RX ADMIN — IPRATROPIUM BROMIDE SCH MG: 0.5 SOLUTION RESPIRATORY (INHALATION) at 23:37

## 2019-03-14 RX ADMIN — NICOTINE SCH EACH: 21 PATCH, EXTENDED RELEASE TOPICAL at 11:42

## 2019-03-14 RX ADMIN — KETOROLAC TROMETHAMINE PRN MG: 30 INJECTION, SOLUTION INTRAMUSCULAR at 16:38

## 2019-03-14 RX ADMIN — LEVALBUTEROL HYDROCHLORIDE SCH MG: 1.25 SOLUTION RESPIRATORY (INHALATION) at 23:37

## 2019-03-14 RX ADMIN — IPRATROPIUM BROMIDE SCH MG: 0.5 SOLUTION RESPIRATORY (INHALATION) at 08:36

## 2019-03-14 RX ADMIN — Medication SCH: at 06:07

## 2019-03-14 RX ADMIN — Medication SCH: at 14:49

## 2019-03-14 RX ADMIN — LEVALBUTEROL HYDROCHLORIDE SCH MG: 1.25 SOLUTION RESPIRATORY (INHALATION) at 00:24

## 2019-03-14 RX ADMIN — FOLIC ACID SCH MG: 1 TABLET ORAL at 11:45

## 2019-03-14 NOTE — RADIOLOGY REPORT (SQ)
EXAM DESCRIPTION:  CHEST SINGLE VIEW



COMPLETED DATE/TIME:  3/14/2019 6:16 am



REASON FOR STUDY:  resp failure/pna



COMPARISON:  3/13/2019.



EXAM PARAMETERS:  NUMBER OF VIEWS: One view.

TECHNIQUE: Single frontal radiographic view of the chest acquired.

RADIATION DOSE: NA

LIMITATIONS: None.



FINDINGS:  LUNGS AND PLEURA: Faint airspace disease in the right upper lobe and left lung base.  Slig
ht improved aeration.

MEDIASTINUM AND HILAR STRUCTURES: No masses.  Contour normal.

HEART AND VASCULAR STRUCTURES: Heart normal in size.  Normal vasculature.

BONES: No acute findings.

HARDWARE: Interval removal of the endotracheal tube and nasogastric tube.

OTHER: No other significant finding.



IMPRESSION:  SLIGHT IMPROVED AERATION IN PREVIOUSLY SEEN BILATERAL AIRSPACE DISEASE.



TECHNICAL DOCUMENTATION:  JOB ID:  [id number]

 2011 [employer]- All Rights Reserved



Reading location - IP/workstation name: [email or web address]

## 2019-03-14 NOTE — PDOC CONSULTATION
Consultation


Consult Date: 19


Attending physician:: PAUL J WEILAND


Consult reason:: RESP FAILURE





History of Present Illness


Admission Date/PCP: 


  19 05:05





  





History of Present Illness: 


NIKOS FERGUSON is a 28 year old female, presented to the emergency room with 

acute shortness of breath she was given bronchodilators ketamine supplemental 

oxygen with no significant improvement she was subsequently intubated and is 

currently in the ICU abated and sedated.  Per his significant other she smokes 

cigarettes heavily.








Past Medical History


Cardiac Medical History: Reports: Heart Murmur


   Denies: Coronary Artery Disease, Hypertension


Pulmonary Medical History: 


   Denies: Asthma, Chronic Obstructive Pulmonary Disease (COPD)


EENT Medical History: Reports: None


Neurological Medical History: 


   Denies: Multiple Sclerosis, Seizures


Endocrine Medical History: 


   Denies: Diabetes Mellitus Type 1, Diabetes Mellitus Type 2


Renal/ Medical History: 


   Denies: Chronic Kidney Disease, Nephrolithiasis


Malignancy Medical History: Reports: None


GI Medical History: Reports: Gastroesophageal Reflux Disease


   Denies: Hiatal Hernia


Musculoskeltal Medical History: 


   Denies: Arthritis, Gout


Skin Medical History: 


   Denies: Eczema, Psoriasis


Psychiatric Medical History: Reports: Tobacco Dependency


   Denies: Alcohol Dependency, Substance Abuse


Traumatic Medical History: Reports: None


Hematology: 


   Denies: Anemia, Hemophilia, Sickle Cell Disease, Bleeding Tendencies


Infectious Medical History: Reports: None


   Denies: HIV





Past Surgical History


Past Surgical History: Reports:  Section - x 3, Tubal Ligation





Social History


Information Source: Relative, [facility] Records


Lives with: Family, Spouse/Significant other - Significant other Darinel


Smoking Status: Current Every Day Smoker


Cigarettes Packs Per Day: 2


Number of Years Smoking: 15


Passive smoke exposure as: Both


Frequency of Alcohol Use: None


Hx Recreational Drug Use: No


Drugs: None


Hx Prescription Drug Abuse: No





- Advance Directive


Resuscitation Status: Full Code





Family History


Family History: CAD, CVA, DM, Hypertension, Malignancy


Parental Family History Reviewed: Yes


Children Family History Reviewed: Yes


Sibling(s) Family History Reviewed.: Yes





Medication/Allergy


Home Medications: 








Aspirin/Acetaminophen/Caffeine [Excedrin Extra Strength Caplet] 1 each PO PRN 

PRN 19 








Allergies/Adverse Reactions: 


                                        





latex [Latex] Allergy (Severe, Verified 19 11:01)


   swelling











Review of Systems


ROS unobtainable: Due to endotracheal tube





Physical Exam


Vital Signs: 


                                        











Temp Pulse Resp BP Pulse Ox


 


 99.0 F   105 H  20   107/64   94 


 


 19 09:03  19 10:00  19 10:00  19 10:00  19 10:00








                                 Intake & Output











 19





 06:59 06:59 06:59


 


Intake Total  269 6


 


Output Total   900


 


Balance  269 -894


 


Weight  60.4 kg 62.2 kg











General appearance: PRESENT: no acute distress, disheveled, well-developed, 

well-nourished.  ABSENT: cooperative


Head exam: PRESENT: atraumatic


Eye exam: PRESENT: conjunctiva pale.  ABSENT: EOMI, nystagmus, periorbital 

swelling, scleral icterus


Mouth exam: PRESENT: dry mucosa, neck supple, tongue midline, other - ET tube


Neck exam: ABSENT: carotid bruit, full ROM, JVD, lymphadenopathy, meningismus, 

tenderness, thyromegaly, tracheal deviation, tracheostomy, other


Respiratory exam: PRESENT: decreased breath sounds, prolonged expiratory phas, 

rales, rhonchi, unlabored.  ABSENT: retraction, stridor


Cardiovascular exam: PRESENT: RRR, +S1, +S2


Pulses: PRESENT: normal radial pulses


GI/Abdominal exam: PRESENT: soft.  ABSENT: tenderness


Gentrourinary exam: PRESENT: indwelling catheter


Extremities exam: ABSENT: calf tenderness, clubbing, joint swelling


Musculoskeletal exam: ABSENT: ambulatory, deformity, dislocation


Neurological exam: ABSENT: awake


Skin exam: PRESENT: dry, warm





Results


Laboratory Results: 


                                        





                                 19 00:39 





                                 19 00:39 





                                        











  19





  00:39 00:39 00:39


 


WBC  15.0 H  


 


RBC  4.81  


 


Hgb  14.5  


 


Hct  42.5  


 


MCV  88  


 


MCH  30.3  


 


MCHC  34.2  


 


RDW  13.8  


 


Plt Count  147 L  


 


Seg Neutrophils %  81.6 H  


 


Lymphocytes %  9.3 L  


 


Monocytes %  6.2  


 


Eosinophils %  2.7  


 


Basophils %  0.2  


 


Absolute Neutrophils  12.2 H  


 


Absolute Lymphocytes  1.4  


 


Absolute Monocytes  0.9  


 


Absolute Eosinophils  0.4  


 


Absolute Basophils  0.0  


 


Carbonic Acid   


 


HCO3/H2CO3 Ratio   


 


ABG pH   


 


ABG pCO2   


 


ABG pO2   


 


ABG HCO3   


 


ABG O2 Saturation   


 


ABG Base Excess   


 


VBG pH    7.47 H


 


VBG pCO2    27.8 L


 


VBG HCO3    19.9 L


 


VBG Base Excess    -2.3


 


FiO2   


 


Sodium   140.1 


 


Potassium   3.7 


 


Chloride   107 


 


Carbon Dioxide   23 


 


Anion Gap   10 


 


BUN   9 


 


Creatinine   0.43 L 


 


Est GFR ( Amer)   > 60 


 


Est GFR (Non-Af Amer)   > 60 


 


Glucose   113 H 


 


Calcium   9.6 


 


Total Bilirubin   0.7 


 


AST   19 


 


ALT   20 


 


Alkaline Phosphatase   85 


 


Total Protein   7.9 


 


Albumin   4.8 


 


Serum HCG, Qual   


 


Urine Color   


 


Urine Appearance   


 


Urine pH   


 


Ur Specific Gravity   


 


Urine Protein   


 


Urine Glucose (UA)   


 


Urine Ketones   


 


Urine Blood   


 


Urine Nitrite   


 


Ur Leukocyte Esterase   


 


Urine WBC (Auto)   


 


Urine RBC (Auto)   














  19





  00:39 03:45 03:50


 


WBC   


 


RBC   


 


Hgb   


 


Hct   


 


MCV   


 


MCH   


 


MCHC   


 


RDW   


 


Plt Count   


 


Seg Neutrophils %   


 


Lymphocytes %   


 


Monocytes %   


 


Eosinophils %   


 


Basophils %   


 


Absolute Neutrophils   


 


Absolute Lymphocytes   


 


Absolute Monocytes   


 


Absolute Eosinophils   


 


Absolute Basophils   


 


Carbonic Acid    1.12


 


HCO3/H2CO3 Ratio    18:1


 


ABG pH    7.37


 


ABG pCO2    37.2


 


ABG pO2    119.6 H


 


ABG HCO3    21.0


 


ABG O2 Saturation    98.3 H


 


ABG Base Excess    -3.7


 


VBG pH   


 


VBG pCO2   


 


VBG HCO3   


 


VBG Base Excess   


 


FiO2    50%


 


Sodium   


 


Potassium   


 


Chloride   


 


Carbon Dioxide   


 


Anion Gap   


 


BUN   


 


Creatinine   


 


Est GFR ( Amer)   


 


Est GFR (Non-Af Amer)   


 


Glucose   


 


Calcium   


 


Total Bilirubin   


 


AST   


 


ALT   


 


Alkaline Phosphatase   


 


Total Protein   


 


Albumin   


 


Serum HCG, Qual  NEGATIVE  


 


Urine Color   STRAW 


 


Urine Appearance   CLEAR 


 


Urine pH   6.0 


 


Ur Specific Gravity   1.059 


 


Urine Protein   NEGATIVE 


 


Urine Glucose (UA)   NEGATIVE 


 


Urine Ketones   20 H 


 


Urine Blood   SMALL H 


 


Urine Nitrite   NEGATIVE 


 


Ur Leukocyte Esterase   NEGATIVE 


 


Urine WBC (Auto)   1 


 


Urine RBC (Auto)   1 








                                        











  19





  00:39 00:39 06:40


 


Creatine Kinase  39   Cancelled


 


CK-MB (CK-2)   < 0.22 


 


Troponin I   < 0.012 














  19





  06:40 07:05 07:05


 


Creatine Kinase    29 L


 


CK-MB (CK-2)  Cancelled  < 0.22 


 


Troponin I  Cancelled  < 0.012 











Impressions: 


                                        





Chest/Abdomen CTA  19 01:41


IMPRESSION:


 


Negative for pulmonary embolus.


 


Perihilar groundglass opacities bilaterally consistent with


pneumonitis


 


TECHNICAL DOCUMENTATION:


 


Quality ID # 436: Final reports with documentation of one or more


dose reduction techniques (e.g., Automated exposure control,


adjustment of the mA and/or kV according to patient size, use of


iterative reconstruction technique)


 


copyright 2011 [employer]- All Rights Reserved


 








Chest X-Ray  19 04:02


IMPRESSION:


 


Endotracheal tube and enteric tubes in place. No pneumothorax


 


 


copyright  [employer]- All Rights Reserved


 














Assessment & Plan





- Diagnosis


(1) Acute respiratory failure with hypoxia


Is this a current diagnosis for this admission?: Yes   


Plan: 


Ventilate and oxygenate maintain adequate pH and SaO2








(2) GERD (gastroesophageal reflux disease)


Qualifiers: 


   Esophagitis presence: without esophagitis   Qualified Code(s): K21.9 - 

Gastro-esophageal reflux disease without esophagitis   


Is this a current diagnosis for this admission?: Yes   


Plan: 


PPI and H2 blocker








(3) Tobacco dependence


Is this a current diagnosis for this admission?: Yes   


Plan: 


Transdermal nicotine








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


Is this a current diagnosis for this admission?: Yes   


Plan: 


Keppra benzo drip








- Time


Total Critical Time (Minutes): 55

## 2019-03-14 NOTE — PDOC PROGRESS REPORT
Subjective


Progress Note for:: 03/14/19


Subjective:: 


Patient extubated yesterday and she tolerated the procedure.  Now she is on 

oxygen via nasal cannula.  She is awake alert oriented and fully conversant.  

Her vitals and blood works are stable so patient downgraded to IMCU.





Reason For Visit: 


ACUTE RESPIRATORY FAILURE WITH HYPOXIA








Physical Exam


Vital Signs: 


                                        











Temp Pulse Resp BP Pulse Ox


 


 98.4 F   65   12   146/97 H  97 


 


 03/14/19 08:00  03/14/19 08:36  03/14/19 08:36  03/14/19 08:00  03/14/19 08:36








                                 Intake & Output











 03/13/19 03/14/19 03/15/19





 06:59 06:59 06:59


 


Intake Total 3955 2736 450


 


Output Total 4041 2190 880


 


Balance -94 -1034 -430


 


Weight 64.6 kg 61.6 kg 











General appearance: PRESENT: no acute distress


Head exam: PRESENT: atraumatic


Eye exam: PRESENT: conjunctiva pink


Mouth exam: PRESENT: moist


Neck exam: ABSENT: carotid bruit, JVD, lymphadenopathy, thyromegaly


Respiratory exam: PRESENT: clear to auscultation lan.  ABSENT: rales, rhonchi, 

wheezes


Cardiovascular exam: PRESENT: RRR.  ABSENT: diastolic murmur, rubs, systolic 

murmur


GI/Abdominal exam: PRESENT: normal bowel sounds, soft.  ABSENT: distended, 

guarding, mass, organolmegaly, rebound, tenderness


Neurological exam: PRESENT: alert, awake, oriented to time, oriented to 

situation





Results


Laboratory Results: 


                                        





                                 03/14/19 03:47 





                                 03/14/19 03:47 





                                        











  03/14/19 03/14/19 03/14/19





  03:47 03:47 05:00


 


WBC  6.6  


 


RBC  3.89  


 


Hgb  11.9 L  


 


Hct  34.5 L  


 


MCV  89  


 


MCH  30.7  


 


MCHC  34.6  


 


RDW  13.6  


 


Plt Count  138 L  


 


Seg Neutrophils %  61.0  


 


Lymphocytes %  22.1  


 


Monocytes %  8.9  


 


Eosinophils %  7.6 H  


 


Basophils %  0.4  


 


Absolute Neutrophils  4.0  


 


Absolute Lymphocytes  1.5  


 


Absolute Monocytes  0.6  


 


Absolute Eosinophils  0.5  


 


Absolute Basophils  0.0  


 


Carbonic Acid    1.11


 


HCO3/H2CO3 Ratio    23:1


 


ABG pH    7.48 H


 


ABG pCO2    36.8


 


ABG pO2    88.2


 


ABG HCO3    26.5 H


 


ABG O2 Saturation    97.3


 


ABG Base Excess    3.0


 


FiO2    ROOM AIR


 


Sodium   140.0 


 


Potassium   3.7 


 


Chloride   107 


 


Carbon Dioxide   26 


 


Anion Gap   7 


 


BUN   8 


 


Creatinine   0.41 L 


 


Est GFR ( Amer)   > 60 


 


Est GFR (Non-Af Amer)   > 60 


 


Glucose   95 


 


Calcium   9.2 


 


Phosphorus   3.8 


 


Magnesium   1.8 


 


Total Bilirubin   0.7 


 


AST   17 


 


ALT   13 


 


Alkaline Phosphatase   55 


 


Total Protein   6.2 L 


 


Albumin   3.4 L 








                                        





03/08/19 19:20   Blood   Blood Culture - Final


                            NO GROWTH IN 5 DAYS


03/08/19 15:00   Blood   Blood Culture - Final


                            NO GROWTH IN 5 DAYS





                                        











  03/08/19 03/08/19 03/08/19





  00:39 00:39 06:40


 


Creatine Kinase  39   Cancelled


 


CK-MB (CK-2)   < 0.22 


 


Troponin I   < 0.012 


 


NT-Pro-B Natriuret Pep   














  03/08/19 03/08/19 03/08/19





  06:40 07:05 07:05


 


Creatine Kinase    29 L


 


CK-MB (CK-2)  Cancelled  < 0.22 


 


Troponin I  Cancelled  < 0.012 


 


NT-Pro-B Natriuret Pep   














  03/08/19 03/08/19 03/09/19





  12:36 12:36 03:45


 


Creatine Kinase  38  


 


CK-MB (CK-2)   0.82 


 


Troponin I   < 0.012 


 


NT-Pro-B Natriuret Pep    358 H











Impressions: 


                                        





Chest/Abdomen CTA  03/08/19 01:41


IMPRESSION:


 


Negative for pulmonary embolus.


 


Perihilar groundglass opacities bilaterally consistent with


pneumonitis


 


TECHNICAL DOCUMENTATION:


 


Quality ID # 436: Final reports with documentation of one or more


dose reduction techniques (e.g., Automated exposure control,


adjustment of the mA and/or kV according to patient size, use of


iterative reconstruction technique)


 


copyright 2011 [employer]- All Rights Reserved


 








Head CT  03/10/19 00:00


IMPRESSION:  NORMAL BRAIN CT WITHOUT CONTRAST.


EVIDENCE OF ACUTE STROKE: NO.


 








Chest X-Ray  03/14/19 06:00


IMPRESSION:  SLIGHT IMPROVED AERATION IN PREVIOUSLY SEEN BILATERAL AIRSPACE 

DISEASE.


 














Assessment & Plan





- Diagnosis


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


Is this a current diagnosis for this admission?: Yes   


Plan: 


Has resolved.


I will discontinue the Keppra and Depakote and watch her.








(2) Acute respiratory failure with hypoxia


Is this a current diagnosis for this admission?: Yes   


Plan: 


Status post extubation.  Improving.








(3) Dyspnea and respiratory abnormalities


Is this a current diagnosis for this admission?: Yes   


Plan: 


Continue current regimen








(4) Tobacco dependence


Is this a current diagnosis for this admission?: Yes   


Plan: 


This morning I counseled and encouraged the patient to quit smoking and she 

voices agreement.








(5) GERD (gastroesophageal reflux disease)


Qualifiers: 


   Esophagitis presence: without esophagitis   Qualified Code(s): K21.9 - 

Gastro-esophageal reflux disease without esophagitis   


Is this a current diagnosis for this admission?: Yes   


Plan: 


Continue PPI.

## 2019-03-15 RX ADMIN — LEVETIRACETAM SCH MLS/HR: 10 INJECTION INTRAVENOUS at 22:14

## 2019-03-15 RX ADMIN — FONDAPARINUX SODIUM SCH MG: 2.5 INJECTION, SOLUTION SUBCUTANEOUS at 08:10

## 2019-03-15 RX ADMIN — VALPROATE SODIUM SCH MLS/HR: 100 INJECTION, SOLUTION INTRAVENOUS at 12:32

## 2019-03-15 RX ADMIN — KETOROLAC TROMETHAMINE PRN MG: 30 INJECTION, SOLUTION INTRAMUSCULAR at 17:06

## 2019-03-15 RX ADMIN — IPRATROPIUM BROMIDE SCH MG: 0.5 SOLUTION RESPIRATORY (INHALATION) at 07:51

## 2019-03-15 RX ADMIN — DOXYCYCLINE SCH MLS/HR: 100 INJECTION, POWDER, LYOPHILIZED, FOR SOLUTION INTRAVENOUS at 22:48

## 2019-03-15 RX ADMIN — FOLIC ACID SCH MG: 1 TABLET ORAL at 09:03

## 2019-03-15 RX ADMIN — LEVALBUTEROL HYDROCHLORIDE SCH MG: 1.25 SOLUTION RESPIRATORY (INHALATION) at 07:51

## 2019-03-15 RX ADMIN — IPRATROPIUM BROMIDE SCH MG: 0.5 SOLUTION RESPIRATORY (INHALATION) at 15:56

## 2019-03-15 RX ADMIN — NICOTINE SCH EACH: 21 PATCH, EXTENDED RELEASE TOPICAL at 09:02

## 2019-03-15 RX ADMIN — BUDESONIDE SCH MG: 0.5 SUSPENSION RESPIRATORY (INHALATION) at 20:46

## 2019-03-15 RX ADMIN — Medication SCH: at 05:15

## 2019-03-15 RX ADMIN — DOXYCYCLINE SCH MLS/HR: 100 INJECTION, POWDER, LYOPHILIZED, FOR SOLUTION INTRAVENOUS at 09:03

## 2019-03-15 RX ADMIN — LEVALBUTEROL HYDROCHLORIDE SCH MG: 1.25 SOLUTION RESPIRATORY (INHALATION) at 15:56

## 2019-03-15 RX ADMIN — DEXTROSE, SODIUM CHLORIDE, AND POTASSIUM CHLORIDE PRN MLS/HR: 5; .45; .15 INJECTION INTRAVENOUS at 08:10

## 2019-03-15 RX ADMIN — LEVETIRACETAM SCH MLS/HR: 10 INJECTION INTRAVENOUS at 11:00

## 2019-03-15 RX ADMIN — BUDESONIDE SCH MG: 0.5 SUSPENSION RESPIRATORY (INHALATION) at 07:51

## 2019-03-15 RX ADMIN — Medication SCH: at 21:14

## 2019-03-15 RX ADMIN — Medication SCH: at 13:36

## 2019-03-15 RX ADMIN — Medication SCH MG: at 09:03

## 2019-03-15 RX ADMIN — VALPROATE SODIUM SCH MLS/HR: 100 INJECTION, SOLUTION INTRAVENOUS at 21:10

## 2019-03-15 NOTE — PDOC PROGRESS REPORT
Subjective


Progress Note for:: 03/15/19


Subjective:: 


Patient is improving progressively.  Physical therapy is working with her.  I 

will take her off Depakote and Keppra and I will follow her how she responds.  

If she remains stable she is potential discharge for tomorrow.





Reason For Visit: 


ACUTE RESPIRATORY FAILURE WITH HYPOXIA








Physical Exam


Vital Signs: 


                                        











Temp Pulse Resp BP Pulse Ox


 


 97.7 F   83   17   112/70   94 


 


 03/15/19 07:21  03/15/19 07:51  03/15/19 07:51  03/15/19 07:21  03/15/19 07:51








                                 Intake & Output











 03/14/19 03/15/19 03/16/19





 06:59 06:59 06:59


 


Intake Total 2736 3542 350


 


Output Total 3770 2810 400


 


Balance -1034 732 -50


 


Weight 61.6 kg  











General appearance: PRESENT: no acute distress, well-developed, well-nourished


Head exam: PRESENT: atraumatic, normocephalic


Eye exam: PRESENT: conjunctiva pink, EOMI, PERRLA.  ABSENT: scleral icterus


Ear exam: PRESENT: normal external ear exam


Mouth exam: PRESENT: moist, tongue midline


Neck exam: ABSENT: carotid bruit, JVD, lymphadenopathy, thyromegaly


Respiratory exam: PRESENT: clear to auscultation lan.  ABSENT: rales, rhonchi, 

wheezes


Cardiovascular exam: PRESENT: RRR.  ABSENT: diastolic murmur, rubs, systolic 

murmur


Pulses: PRESENT: normal dorsalis pedis pul


Vascular exam: PRESENT: normal capillary refill


GI/Abdominal exam: PRESENT: normal bowel sounds, soft.  ABSENT: distended, 

guarding, mass, organolmegaly, rebound, tenderness


Rectal exam: PRESENT: deferred


Extremities exam: PRESENT: full ROM.  ABSENT: calf tenderness, clubbing, pedal 

edema


Neurological exam: PRESENT: alert, awake, oriented to person, oriented to place,

oriented to time, oriented to situation, CN II-XII grossly intact.  ABSENT: 

motor sensory deficit


Psychiatric exam: PRESENT: appropriate affect, normal mood.  ABSENT: homicidal 

ideation, suicidal ideation


Skin exam: PRESENT: dry, intact, warm.  ABSENT: cyanosis, rash





Results


Laboratory Results: 


                                        





                                 03/14/19 03:47 





                                 03/14/19 03:47 





                                        





03/12/19 16:00   Vaginal   Gram Stain - Final


03/12/19 16:00   Vaginal   Vaginal Culture - Final


                            NO GROWTH 3 DAYS





                                        











  03/08/19 03/08/19 03/08/19





  00:39 00:39 06:40


 


Creatine Kinase  39   Cancelled


 


CK-MB (CK-2)   < 0.22 


 


Troponin I   < 0.012 


 


NT-Pro-B Natriuret Pep   














  03/08/19 03/08/19 03/08/19





  06:40 07:05 07:05


 


Creatine Kinase    29 L


 


CK-MB (CK-2)  Cancelled  < 0.22 


 


Troponin I  Cancelled  < 0.012 


 


NT-Pro-B Natriuret Pep   














  03/08/19 03/08/19 03/09/19





  12:36 12:36 03:45


 


Creatine Kinase  38  


 


CK-MB (CK-2)   0.82 


 


Troponin I   < 0.012 


 


NT-Pro-B Natriuret Pep    358 H











Impressions: 


                                        





Chest/Abdomen CTA  03/08/19 01:41


IMPRESSION:


 


Negative for pulmonary embolus.


 


Perihilar groundglass opacities bilaterally consistent with


pneumonitis


 


TECHNICAL DOCUMENTATION:


 


Quality ID # 436: Final reports with documentation of one or more


dose reduction techniques (e.g., Automated exposure control,


adjustment of the mA and/or kV according to patient size, use of


iterative reconstruction technique)


 


copyright 2011 [employer]- All Rights Reserved


 








Head CT  03/10/19 00:00


IMPRESSION:  NORMAL BRAIN CT WITHOUT CONTRAST.


EVIDENCE OF ACUTE STROKE: NO.


 








Chest X-Ray  03/14/19 06:00


IMPRESSION:  SLIGHT IMPROVED AERATION IN PREVIOUSLY SEEN BILATERAL AIRSPACE 

DISEASE.


 














Assessment & Plan





- Diagnosis


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


Is this a current diagnosis for this admission?: Yes   


Plan: 


I will discontinue Keppra and Depakote








(2) Acute respiratory failure with hypoxia


Is this a current diagnosis for this admission?: Yes   


Plan: 


Status post extubation.  Improving.








(3) Dyspnea and respiratory abnormalities


Is this a current diagnosis for this admission?: Yes   


Plan: 


Continue current regimen








(4) Tobacco dependence


Is this a current diagnosis for this admission?: Yes   


Plan: 


This morning I counseled and encouraged the patient to quit smoking and she 

voices agreement.








(5) GERD (gastroesophageal reflux disease)


Qualifiers: 


   Esophagitis presence: without esophagitis   Qualified Code(s): K21.9 - 

Gastro-esophageal reflux disease without esophagitis   


Is this a current diagnosis for this admission?: Yes   


Plan: 


Continue PPI.

## 2019-03-16 VITALS — DIASTOLIC BLOOD PRESSURE: 87 MMHG | SYSTOLIC BLOOD PRESSURE: 134 MMHG

## 2019-03-16 RX ADMIN — FOLIC ACID SCH MG: 1 TABLET ORAL at 09:50

## 2019-03-16 RX ADMIN — LEVALBUTEROL HYDROCHLORIDE SCH MG: 1.25 SOLUTION RESPIRATORY (INHALATION) at 08:27

## 2019-03-16 RX ADMIN — NICOTINE SCH EACH: 21 PATCH, EXTENDED RELEASE TOPICAL at 09:50

## 2019-03-16 RX ADMIN — Medication SCH MG: at 09:50

## 2019-03-16 RX ADMIN — FONDAPARINUX SODIUM SCH MG: 2.5 INJECTION, SOLUTION SUBCUTANEOUS at 07:59

## 2019-03-16 RX ADMIN — IPRATROPIUM BROMIDE SCH MG: 0.5 SOLUTION RESPIRATORY (INHALATION) at 08:27

## 2019-03-16 RX ADMIN — IPRATROPIUM BROMIDE SCH MG: 0.5 SOLUTION RESPIRATORY (INHALATION) at 00:27

## 2019-03-16 RX ADMIN — BUDESONIDE SCH MG: 0.5 SUSPENSION RESPIRATORY (INHALATION) at 08:27

## 2019-03-16 RX ADMIN — Medication SCH: at 05:00

## 2019-03-16 RX ADMIN — LEVALBUTEROL HYDROCHLORIDE SCH MG: 1.25 SOLUTION RESPIRATORY (INHALATION) at 00:27

## 2019-03-16 NOTE — PDOC DISCHARGE SUMMARY
General





- Admit/Disc Date/PCP


Admission Date/Primary Care Provider: 


  03/08/19 05:05





  





Discharge Date: 03/16/19





- Discharge Diagnosis


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


Is this a current diagnosis for this admission?: Yes   





(2) Acute respiratory failure with hypoxia


Is this a current diagnosis for this admission?: Yes   





(3) Dyspnea and respiratory abnormalities


Is this a current diagnosis for this admission?: Yes   





(4) Tobacco dependence


Is this a current diagnosis for this admission?: Yes   





(5) GERD (gastroesophageal reflux disease)


Is this a current diagnosis for this admission?: Yes   





- Additional Information


Resuscitation Status: Full Code


Home Medications: 








Aspirin/Acetaminophen/Caffeine [Excedrin Extra Strength Caplet] 1 each PO PRN 

PRN 03/08/19 











History of Present Illness


History of Present Illness: 


NIKOS FERGUSON is a 28 year old female who presented to the emergency room 

with acute dyspnea.  Patient related that she developed progressive dyspnea 

during the day prior to her presentation and her symptoms increased to the point

where she felt that her dyspnea was severe and presented to the emergency room. 

Her symptoms were accompanied by a nonproductive cough and progressively 

worsening wheezing.  She denied prior similar episodes and had not identified 

any aggravating or ameliorating factors for her dyspnea and wheezing.  In the 

emergency room she was found to be tachypneic and hypoxic with wheezing.  She 

was treated with intravenous steroids as well as nebulizer treatments without 

improvement.  She was subsequently placed on BiPAP also without improvement.  

She was given a dissociative dose of ketamine again without any improvement in 

her respiratory status.  Her condition progressively declined to the point where

she required intubation due to acute respiratory failure with hypoxia and physic

al exhaustion due to the progressively increased work of breathing.  She was 

subsequently admitted in the ICU for further evaluation and treatment.











Hospital Course


Hospital Course: 


This is 28 years old  female patient presented to emergency department 

with chief complaint of progressively worsening dyspnea.  Initially patient was 

managed with DuoNeb and steroid without improvement and later she was put on 

BiPAP still her shortness of breath is progressively increasing so at this time 

patient emergently intubated and transferred to ICU.  She has been managed with 

Zosyn and doxycycline.  Inserted a chest x-ray revealed pneumonitis the Zosyn d

iscontinued and continue the doxycycline.  During her stay EEG was done and 

interpreted by telemetry neurologist as nonconvulsive status epilepticus for 

which Depakote is added to her Keppra.  3 days ago patient was extubated she 

tolerated the procedure initially she was put on BiPAP and later on oxygen via 

nasal cannula.  Today patient is off  oxygen and her O2 saturation is 96% on 

room air.  No more seizure activity witnessed so the Keppra and Depakote 

discontinued.  Since patient is a heavy smoker and I counseled her and strongly 

advised her to quit smoking and she voices agreement.  Her vitals and blood 

works are stable patient is good to go today.








Physical Exam


Vital Signs: 


                                        











Temp Pulse Resp BP Pulse Ox


 


 97.5 F   69   12   114/57 L  97 


 


 03/16/19 07:15  03/16/19 07:15  03/16/19 07:15  03/16/19 07:15  03/16/19 07:15








                                 Intake & Output











 03/15/19 03/16/19 03/17/19





 06:59 06:59 06:59


 


Intake Total 3542 1477 


 


Output Total 2810 1100 


 


Balance 732 377 


 


Weight  61.1 kg 











General appearance: PRESENT: no acute distress, well-developed, well-nourished


Head exam: PRESENT: atraumatic, normocephalic


Eye exam: PRESENT: conjunctiva pink, EOMI, PERRLA.  ABSENT: scleral icterus


Ear exam: PRESENT: normal external ear exam


Mouth exam: PRESENT: moist, tongue midline


Neck exam: ABSENT: carotid bruit, JVD, lymphadenopathy, thyromegaly


Respiratory exam: PRESENT: clear to auscultation lan.  ABSENT: rales, rhonchi, 

wheezes


Cardiovascular exam: PRESENT: RRR.  ABSENT: diastolic murmur, rubs, systolic 

murmur


Pulses: PRESENT: normal dorsalis pedis pul


Vascular exam: PRESENT: normal capillary refill


GI/Abdominal exam: PRESENT: normal bowel sounds, soft.  ABSENT: distended, 

guarding, mass, organolmegaly, rebound, tenderness


Rectal exam: PRESENT: deferred


Extremities exam: PRESENT: full ROM.  ABSENT: calf tenderness, clubbing, pedal 

edema


Neurological exam: PRESENT: alert, awake, oriented to person, oriented to place,

oriented to time, oriented to situation, CN II-XII grossly intact.  ABSENT: 

motor sensory deficit


Psychiatric exam: PRESENT: appropriate affect, normal mood.  ABSENT: homicidal 

ideation, suicidal ideation


Skin exam: PRESENT: dry, intact, warm.  ABSENT: cyanosis, rash





Results


Laboratory Results: 


                                        





                                 03/14/19 03:47 





                                 03/14/19 03:47 





                                        





03/12/19 16:00   Vaginal   Gram Stain - Final


03/12/19 16:00   Vaginal   Vaginal Culture - Final


                            NO GROWTH 3 DAYS





                                        











  03/08/19 03/08/19 03/08/19





  00:39 00:39 06:40


 


Creatine Kinase  39   Cancelled


 


CK-MB (CK-2)   < 0.22 


 


Troponin I   < 0.012 


 


NT-Pro-B Natriuret Pep   














  03/08/19 03/08/19 03/08/19





  06:40 07:05 07:05


 


Creatine Kinase    29 L


 


CK-MB (CK-2)  Cancelled  < 0.22 


 


Troponin I  Cancelled  < 0.012 


 


NT-Pro-B Natriuret Pep   














  03/08/19 03/08/19 03/09/19





  12:36 12:36 03:45


 


Creatine Kinase  38  


 


CK-MB (CK-2)   0.82 


 


Troponin I   < 0.012 


 


NT-Pro-B Natriuret Pep    358 H











Impressions: 


                                        





Chest/Abdomen CTA  03/08/19 01:41


IMPRESSION:


 


Negative for pulmonary embolus.


 


Perihilar groundglass opacities bilaterally consistent with


pneumonitis


 


TECHNICAL DOCUMENTATION:


 


Quality ID # [id number]: Final reports with documentation of one or more


dose reduction techniques (e.g., Automated exposure control,


adjustment of the mA and/or kV according to patient size, use of


iterative reconstruction technique)


 


copyright 2011 [employer]- All Rights Reserved


 








Head CT  03/10/19 00:00


IMPRESSION:  NORMAL BRAIN CT WITHOUT CONTRAST.


EVIDENCE OF ACUTE STROKE: NO.


 








Chest X-Ray  03/14/19 06:00


IMPRESSION:  SLIGHT IMPROVED AERATION IN PREVIOUSLY SEEN BILATERAL AIRSPACE 

DISEASE.


 














Qualifiers





- *


PATIENT BEING DISCHARGED WITH ANY OF THE FOLLOWING DIAGNOSIS: No

## 2019-04-04 ENCOUNTER — HOSPITAL ENCOUNTER (EMERGENCY)
Dept: HOSPITAL 62 - ER | Age: 29
Discharge: HOME | End: 2019-04-04
Payer: SELF-PAY

## 2019-04-04 VITALS — DIASTOLIC BLOOD PRESSURE: 88 MMHG | SYSTOLIC BLOOD PRESSURE: 150 MMHG

## 2019-04-04 DIAGNOSIS — R07.9: ICD-10-CM

## 2019-04-04 DIAGNOSIS — M54.6: ICD-10-CM

## 2019-04-04 DIAGNOSIS — M54.9: ICD-10-CM

## 2019-04-04 DIAGNOSIS — Z87.891: ICD-10-CM

## 2019-04-04 DIAGNOSIS — M79.10: ICD-10-CM

## 2019-04-04 DIAGNOSIS — R07.1: Primary | ICD-10-CM

## 2019-04-04 DIAGNOSIS — R06.00: ICD-10-CM

## 2019-04-04 PROCEDURE — 93010 ELECTROCARDIOGRAM REPORT: CPT

## 2019-04-04 PROCEDURE — 36415 COLL VENOUS BLD VENIPUNCTURE: CPT

## 2019-04-04 PROCEDURE — 85379 FIBRIN DEGRADATION QUANT: CPT

## 2019-04-04 PROCEDURE — 93005 ELECTROCARDIOGRAM TRACING: CPT

## 2019-04-04 PROCEDURE — S0119 ONDANSETRON 4 MG: HCPCS

## 2019-04-04 PROCEDURE — 71045 X-RAY EXAM CHEST 1 VIEW: CPT

## 2019-04-04 PROCEDURE — 94640 AIRWAY INHALATION TREATMENT: CPT

## 2019-04-04 PROCEDURE — 81025 URINE PREGNANCY TEST: CPT

## 2019-04-04 PROCEDURE — 99284 EMERGENCY DEPT VISIT MOD MDM: CPT

## 2019-04-04 PROCEDURE — 96374 THER/PROPH/DIAG INJ IV PUSH: CPT

## 2019-04-04 NOTE — ER DOCUMENT REPORT
ED General





- General


Chief Complaint: Chest Pain


Stated Complaint: CHEST PAIN


Time Seen by Provider: 19 10:41


Mode of Arrival: Ambulatory


Information source: Patient, [facility] Records


Notes: 





28-year-old female presents with complaint of shortness of breath, chest pain, 

back pain, pain with inspiration.  Patient states symptoms started 1 day prior 

to arrival.  She describes her pain as a tightness.  Patient did have a recent 

hospital admission in 2019 where she was found to be in acute respiratory 

failure and required intubation.  Patient reports that she had a chest tube 

placed but I cannot find documentation of this.  Patient denies any recent 

illness, fever, chills, cough, leg swelling.  She does report that she has quit 

smoking since her last hospitalizations.  She was diagnosed with asthma but not 

given any asthma medications.


TRAVEL OUTSIDE OF THE [place] IN LAST 30 DAYS: No





- HPI


Onset: Yesterday


Onset/Duration: Gradual, Persistent


Quality of pain: Achy, Other - Tightness


Severity: Mild


Associated symptoms: Body/muscle aches, Chest pain, Hurts to breath, Shortness 

of breath.  denies: Nonproductive cough, Productive cough, Fever, Leg swelling, 

Nausea


Exacerbated by: Deep breathing


Relieved by: Denies


Similar symptoms previously: Yes


Recently seen / treated by doctor: No





- Related Data


Allergies/Adverse Reactions: 


                                        





latex [Latex] Allergy (Severe, Verified 19 10:27)


   swelling











Past Medical History





- General


Information source: Patient





- Social History


Smoking Status: Former Smoker


Frequency of alcohol use: None


Drug Abuse: None


Lives with: Spouse/Significant other


Family History: CAD, CVA, DM, Hypertension, Malignancy


Patient has suicidal ideation: No


Patient has homicidal ideation: No





- Past Medical History


Cardiac Medical History: Reports: Hx Heart Murmur


   Denies: Hx Coronary Artery Disease, Hx Hypertension


Pulmonary Medical History: 


   Denies: Hx Asthma, Hx COPD


Neurological Medical History: Denies: Hx Cerebrovascular Accident, Hx Seizures


Endocrine Medical History: Denies: Hx Diabetes Mellitus Type 1, Hx Diabetes 

Mellitus Type 2


Renal/ Medical History: Denies: Hx Peritoneal Dialysis


GI Medical History: Reports: Hx Gastroesophageal Reflux Disease, Hx Ulcer - 

gastric hx of.  Denies: Hx Hiatal Hernia


Musculoskeletal Medical History: Denies Hx Arthritis, Denies Hx Gout


Skin Medical History: Denies Hx Eczema, Denies Hx Psoriasis


Psychiatric Medical History: 


Infectious Medical History: Denies: Hx HIV


Past Surgical History: Reports: Hx  Section - x 3, Hx Tubal Ligation





- Immunizations


Immunizations up to date: Yes


Hx Diphtheria, Pertussis, Tetanus Vaccination: Yes - 





Review of Systems





- Review of Systems


Constitutional: Recent illness.  denies: Fever, Weakness


EENT: denies: Blurred vision, Nose congestion, Difficulty swallowing


Cardiovascular: Chest pain, Dyspnea.  denies: Palpitations


Respiratory: Short of breath.  denies: Cough


Gastrointestinal: denies: Abdominal pain, Nausea, Vomiting


Genitourinary: denies: Dysuria


Female Genitourinary: Other


Musculoskeletal: Back pain


Skin: denies: Rash


Hematologic/Lymphatic: No symptoms reported


Neurological/Psychological: denies: Confusion, Loss of power, Headaches


-: Yes All other systems reviewed and negative





Physical Exam





- Vital signs


Vitals: 


                                        











Temp Pulse Resp BP Pulse Ox


 


 97.6 F   79   36 H  150/88 H  100 


 


 19 10:36  19 10:36  19 10:36  19 10:36  19 10:36














- Notes


Notes: 





PHYSICAL EXAMINATION:





GENERAL: Well-appearing, well-nourished and in no acute distress.





HEAD: Atraumatic, normocephalic.





EYES: Pupils equal round and reactive to light, extraocular movements intact, 

conjunctiva are normal.





ENT: Nares patent, oropharynx clear without exudates.  Moist mucous membranes.





NECK: Normal range of motion, supple without lymphadenopathy





LUNGS: Breath sounds clear to auscultation bilaterally and equal.  No wheezes 

rales or rhonchi.





HEART: Regular rate and rhythm without murmurs





ABDOMEN: Soft, nontender, nondistended abdomen.  No guarding, no rebound.  No 

masses appreciated.





Female : deferred





Musculoskeletal: Normal range of motion, no pitting or edema.  No cyanosis.





NEUROLOGICAL: Cranial nerves grossly intact.  Normal speech, normal gait.  

Normal sensory, motor exams





PSYCH: Normal mood, normal affect.





SKIN: Warm, Dry, normal turgor, no rashes or lesions noted.





Course





- Re-evaluation


Re-evalutation: 





Laboratory











  19





  11:10 14:26


 


D-Dimer  0.27 


 


Urine HCG, Qual   NEGATIVE











                                        





Chest X-Ray  19 10:42


IMPRESSION:  NO SIGNIFICANT RADIOGRAPHIC FINDING IN THE CHEST.


 











                                        











Temp Pulse Resp BP Pulse Ox


 


 98.3 F   79   36 H  150/88 H  100 


 


 19 14:50  19 10:36  19 10:36  19 10:36  19 10:36








28-year-old female  presents with complaint of chest tightness, upper back pain 

and shortness of breath.  Did have a recent admission for acute respiratory 

failure and states that she was diagnosed with asthma but was not sent home on 

any asthma medication.  Patient does report nasal congestion, dry cough 

associated with the shortness of breath.  Her lung exam is within normal limits.

  She has no wheezing, increased work of breathing, tachypnea, dyspnea, 

accessory muscle use.  She did receive a breathing treatment and does report 

improvement of her shortness of breath.  She states that she still has pain in 

her upper back.  Patient was provided Toradol and Zofran.


19 14:47


Patient reevaluated.  Reports improvement of pain, shortness of breath.  D-dimer

 negative.  PERC negative.  Patient did receive a breathing treatment, Toradol. 

 Patient was discharged home with an albuterol inhaler and advised to return 

with any concerns.





Patient was evaluated and treated as appropriate for the patient's presenting 

symptoms and complaint, with consideration of any critical or life threatening 

conditions that may be associated with their obtained history and exam as noted 

above. All results were discussed with patient . Patient provided the 

opportunity to ask questions, and express concerns. Patient was educated on 

treatments based on their presumed diagnosis as noted above.  At this time we 

will discharge the patient with return precautions and follow-up 

recommendations.  Verbal discharge instructions given a the bedside. Medication 

warnings reviewed. Patient is in agreement with this plan and has verbalized 

understanding of return precautions. 





After careful consideration I feel that that patient can be safely discharged 

from the emergency department,  they were advised to followup with a primary 

care physician in 2-3 days. 








Dictation on this chart was performed using voice recognition software and may 

result in unintended grammatical, spelling, syntax or errors.














19 12:30








- Vital Signs


Vital signs: 


                                        











Temp Pulse Resp BP Pulse Ox


 


 98.3 F   79   36 H  150/88 H  100 


 


 19 14:50  19 10:36  19 10:36  19 10:36  19 10:36














- Diagnostic Test


Radiology reviewed: Image reviewed, Reports reviewed





- EKG Interpretation by Me


EKG shows normal: Sinus rhythm


Rate: Normal


Rhythm: NSR





Discharge





- Discharge


Clinical Impression: 


Dyspnea


Qualifiers:


 Dyspnea type: unspecified Qualified Code(s): R06.00 - Dyspnea, unspecified





Back pain


Qualifiers:


 Back pain location: thoracic back pain Chronicity: unspecified Back pain 

laterality: bilateral Qualified Code(s): M54.6 - Pain in thoracic spine





Chest pain


Qualifiers:


 Chest pain type: chest pain on breathing Qualified Code(s): R07.1 - Chest pain 

on breathing





Condition: Good


Disposition: HOME, SELF-CARE


Instructions:  Chest Pain of Unclear Cause ([facility]), Dyspnea, Nonspecific ([facility])


Additional Instructions: 


Follow up with your [id number] hours  for further care or return to the ED

 IMMEDIATELY if symptoms worsen or you have any concerns.  If you cannot afford 

to follow up with your primary care physician a list of low cost clinics have 

been provided at the end of your discharge papers as well.











Most prescribed medications have multiple side effects.  The safest thing to do 

is when filling your prescription  speak to your pharmacist regarding possible 

interactions with your normal home medications and over the counter medications 

such as Ibuprofen, Tylenol, Benadryl.  If you experience any symptoms that cause

 you discomfort or concern you should discontinue the medication immediately and

 return to the emergency room or call your primary care physician.


Forms:  Elevated Blood Pressure

## 2019-04-04 NOTE — RADIOLOGY REPORT (SQ)
EXAM DESCRIPTION:  CHEST SINGLE VIEW



COMPLETED DATE/TIME:  4/4/2019 11:00 am



REASON FOR STUDY:  eval for pneumothorax



COMPARISON:  None.



NUMBER OF VIEWS:  One view.



TECHNIQUE:  Single frontal radiographic view of the chest acquired.



LIMITATIONS:  None.



FINDINGS:  LUNGS AND PLEURA: No opacities, masses or pneumothorax. No pleural effusion.

MEDIASTINUM AND HILAR STRUCTURES: No masses.  Contour normal.

HEART AND VASCULAR STRUCTURES: Heart normal in size.  Normal vasculature.

BONES: No acute findings.

HARDWARE: None in the chest.

OTHER: No other significant finding.



IMPRESSION:  NO SIGNIFICANT RADIOGRAPHIC FINDING IN THE CHEST.



TECHNICAL DOCUMENTATION:  JOB ID:  [id number]

 2011 [employer]- All Rights Reserved



Reading location - IP/workstation name: [email or web address]

## 2019-04-04 NOTE — ER DOCUMENT REPORT
ED Medical Screen (RME)





- General


Chief Complaint: Chest Pain


Stated Complaint: CHEST PAIN


Time Seen by Provider: 19 10:41


Mode of Arrival: Ambulatory


Information source: Patient


Notes: 





Patient is a 28-year-old female who presents to the emergency department with 

complaints of chest pain, tightness to the right side, right shoulder pain and 

upper chest pain.  She reports increased pain with breathing.  She states all 

symptoms started last night.  Patient reports she was seen here approximately 1 

month ago for a pneumothorax in which she states she was in the ICU and chest 

tubes.  Patient reports this feels similar.





Exam:


Patient alert, oriented and anxious.


Lung sounds present bilaterally in all lobes.  





*Patient will be taken straight for chest x-ray and placed into a room.





I have greeted and performed a rapid initial assessment of this patient.  A 

comprehensive ED assessment and evaluation of the patient, analysis of test 

results and completion of the medical decision making process will be conducted 

by additional ED providers.  Dictation of this chart was performed using voice 

recognition software; therefore, there may be some unintended grammatical 

errors.


TRAVEL OUTSIDE OF THE [place] IN LAST 30 DAYS: No





- Related Data


Allergies/Adverse Reactions: 


                                        





latex [Latex] Allergy (Severe, Verified 19 10:27)


   swelling











Past Medical History





- Social History


Frequency of alcohol use: None


Drug Abuse: None





- Past Medical History


Cardiac Medical History: Reports: Hx Heart Murmur


   Denies: Hx Coronary Artery Disease, Hx Hypertension


Pulmonary Medical History: 


   Denies: Hx Asthma, Hx COPD


Neurological Medical History: Denies: Hx Cerebrovascular Accident, Hx Seizures


Endocrine Medical History: Denies: Hx Diabetes Mellitus Type 1, Hx Diabetes 

Mellitus Type 2


Renal/ Medical History: Denies: Hx Peritoneal Dialysis


GI Medical History: Reports: Hx Gastroesophageal Reflux Disease, Hx Ulcer - 

gastric hx of.  Denies: Hx Hiatal Hernia


Musculoskeltal Medical History: Denies Hx Arthritis, Denies Hx Gout


Skin Medical History: Denies Hx Eczema, Denies Hx Psoriasis


Psychiatric Medical History: 


Infectious Medical History: Denies: Hx HIV


Past Surgical History: Reports: Hx  Section - x 3, Hx Tubal Ligation





- Immunizations


Immunizations up to date: Yes


Hx Diphtheria, Pertussis, Tetanus Vaccination: Yes - 





Physical Exam





- Vital signs


Vitals: 





                                        











Temp Pulse Resp BP Pulse Ox


 


 97.6 F   79   36 H  150/88 H  100 


 


 19 10:36  19 10:36  19 10:36  19 10:36  19 10:36














Course





- Vital Signs


Vital signs: 





                                        











Temp Pulse Resp BP Pulse Ox


 


 97.6 F   79   36 H  150/88 H  100 


 


 19 10:36  19 10:36  19 10:36  19 10:36  19 10:36

## 2019-09-11 ENCOUNTER — HOSPITAL ENCOUNTER (EMERGENCY)
Dept: HOSPITAL 62 - ER | Age: 29
Discharge: HOME | End: 2019-09-11
Payer: MEDICAID

## 2019-09-11 VITALS — DIASTOLIC BLOOD PRESSURE: 77 MMHG | SYSTOLIC BLOOD PRESSURE: 116 MMHG

## 2019-09-11 DIAGNOSIS — R06.02: ICD-10-CM

## 2019-09-11 DIAGNOSIS — F17.200: ICD-10-CM

## 2019-09-11 DIAGNOSIS — R07.9: Primary | ICD-10-CM

## 2019-09-11 DIAGNOSIS — G40.909: ICD-10-CM

## 2019-09-11 LAB
ADD MANUAL DIFF: NO
ANION GAP SERPL CALC-SCNC: 12 MMOL/L (ref 5–19)
BASOPHILS # BLD AUTO: 0.1 10^3/UL (ref 0–0.2)
BASOPHILS NFR BLD AUTO: 0.6 % (ref 0–2)
BUN SERPL-MCNC: 15 MG/DL (ref 7–20)
CALCIUM: 9.5 MG/DL (ref 8.4–10.2)
CHLORIDE SERPL-SCNC: 107 MMOL/L (ref 98–107)
CO2 SERPL-SCNC: 21 MMOL/L (ref 22–30)
EOSINOPHIL # BLD AUTO: 0.2 10^3/UL (ref 0–0.6)
EOSINOPHIL NFR BLD AUTO: 2.4 % (ref 0–6)
ERYTHROCYTE [DISTWIDTH] IN BLOOD BY AUTOMATED COUNT: 13.2 % (ref 11.5–14)
GLUCOSE SERPL-MCNC: 88 MG/DL (ref 75–110)
HCT VFR BLD CALC: 40.6 % (ref 36–47)
HGB BLD-MCNC: 13.8 G/DL (ref 12–15.5)
LYMPHOCYTES # BLD AUTO: 2.9 10^3/UL (ref 0.5–4.7)
LYMPHOCYTES NFR BLD AUTO: 31.1 % (ref 13–45)
MCH RBC QN AUTO: 30.5 PG (ref 27–33.4)
MCHC RBC AUTO-ENTMCNC: 34 G/DL (ref 32–36)
MCV RBC AUTO: 90 FL (ref 80–97)
MONOCYTES # BLD AUTO: 0.7 10^3/UL (ref 0.1–1.4)
MONOCYTES NFR BLD AUTO: 7.6 % (ref 3–13)
NEUTROPHILS # BLD AUTO: 5.5 10^3/UL (ref 1.7–8.2)
NEUTS SEG NFR BLD AUTO: 58.3 % (ref 42–78)
PLATELET # BLD: 129 10^3/UL (ref 150–450)
POTASSIUM SERPL-SCNC: 4 MMOL/L (ref 3.6–5)
RBC # BLD AUTO: 4.52 10^6/UL (ref 3.72–5.28)
TOTAL CELLS COUNTED % (AUTO): 100 %
WBC # BLD AUTO: 9.4 10^3/UL (ref 4–10.5)

## 2019-09-11 PROCEDURE — 84703 CHORIONIC GONADOTROPIN ASSAY: CPT

## 2019-09-11 PROCEDURE — 99284 EMERGENCY DEPT VISIT MOD MDM: CPT

## 2019-09-11 PROCEDURE — 96374 THER/PROPH/DIAG INJ IV PUSH: CPT

## 2019-09-11 PROCEDURE — 85025 COMPLETE CBC W/AUTO DIFF WBC: CPT

## 2019-09-11 PROCEDURE — 93005 ELECTROCARDIOGRAM TRACING: CPT

## 2019-09-11 PROCEDURE — 80048 BASIC METABOLIC PNL TOTAL CA: CPT

## 2019-09-11 PROCEDURE — 36415 COLL VENOUS BLD VENIPUNCTURE: CPT

## 2019-09-11 PROCEDURE — 71046 X-RAY EXAM CHEST 2 VIEWS: CPT

## 2019-09-11 PROCEDURE — 93010 ELECTROCARDIOGRAM REPORT: CPT

## 2019-09-11 NOTE — RADIOLOGY REPORT (SQ)
XR CHEST 2 VIEWS



EXAM DATE: 9/11/2019 2:05 AM CDT



HISTORY: Shortness of breath, right-sided chest pain.



COMPARISON: None.



FINDINGS:



The heart size is within normal limits. No consolidation, pleural

effusion, or pneumothorax is seen. The bony thorax is intact.



IMPRESSION:



No evidence of acute cardiopulmonary disease.

## 2019-09-11 NOTE — ER DOCUMENT REPORT
ED Respiratory Problem





- General


Chief Complaint: Breathing Difficulty


Stated Complaint: TROUBLE BREATHING


Time Seen by Provider: 19 02:00


Primary Care Provider: 


NADIA MORENO MD [Primary Care Provider] - Follow up as needed


Notes: 





Patient is a 28-year-old female that comes to the emergency department for chief

complaint of sensation of a pin pressing into the right side of her chest, she 

states that if she holds her breath feels like it improves, she states that 

every time she breathes it hurts, she states she feels like she cannot catch her

breath.  She states that she does smoke, she is not diagnosed with asthma or 

COPD, she states that previously she ended up intubated for difficulty 

breathing.  The specific of this are confusing however.  She also reports 

history of epilepsy but she is not on medications for this.  She denies seizure 

activity.


TRAVEL OUTSIDE OF THE [place] IN LAST 30 DAYS: No





- Related Data


Allergies/Adverse Reactions: 


                                        





latex [Latex] Allergy (Severe, Verified 19 01:35)


   swelling











Past Medical History





- General


Information source: Patient





- Social History


Smoking Status: Current Every Day Smoker


Smoking Education Provided: Yes - <3 min


Frequency of alcohol use: None


Lives with: Spouse/Significant other


Family History: CAD, CVA, DM, Hypertension, Malignancy





- Past Medical History


Cardiac Medical History: Reports: Hx Heart Murmur


   Denies: Hx Coronary Artery Disease, Hx Hypertension


Pulmonary Medical History: 


   Denies: Hx Asthma, Hx COPD


Neurological Medical History: Denies: Hx Cerebrovascular Accident, Hx Seizures


Endocrine Medical History: Denies: Hx Diabetes Mellitus Type 1, Hx Diabetes 

Mellitus Type 2


Renal/ Medical History: Denies: Hx Peritoneal Dialysis


GI Medical History: Reports: Hx Gastroesophageal Reflux Disease, Hx Ulcer - 

gastric hx of.  Denies: Hx Hiatal Hernia


Musculoskeletal Medical History: Denies Hx Arthritis, Denies Hx Gout


Skin Medical History: Denies Hx Eczema, Denies Hx Psoriasis


Psychiatric Medical History: 


Infectious Medical History: Denies: Hx HIV


Past Surgical History: Reports: Hx  Section - x 3, Hx Tubal Ligation





- Immunizations


Immunizations up to date: Yes


Hx Diphtheria, Pertussis, Tetanus Vaccination: Yes - 





Review of Systems





- Review of Systems


Constitutional: No symptoms reported


EENT: No symptoms reported


Cardiovascular: See HPI


Respiratory: See HPI


Gastrointestinal: No symptoms reported


Genitourinary: No symptoms reported


Female Genitourinary: No symptoms reported


Musculoskeletal: No symptoms reported


Skin: No symptoms reported


Hematologic/Lymphatic: No symptoms reported


Neurological/Psychological: No symptoms reported





Physical Exam





- Vital signs


Vitals: 


                                        











Temp Pulse Resp BP Pulse Ox


 


 98.5 F   74   18   141/85 H  99 


 


 19 01:51  19 01:51  19 01:51  19 01:51  19 01:51














- Notes


Notes: 





GENERAL: Alert, wide-eyed, slightly tachypneic, appears anxious


HEAD: Normocephalic, atraumatic.


EYES: Pupils equal, round, and reactive to light. Extraocular movements intact.


ENT: Oral mucosa moist, tongue midline. Oropharynx unremarkable. Airway patent. 


NECK: Full range of motion. Supple. Trachea midline.


LUNGS: Lungs are clear bilaterally, patient with tachypnea, patient with 

tenderness which is specific and reproducible over the right side of the chest 

with movement and palpation.  No erythema, induration, fluctuance, or crepitus.


HEART: Regular rate and rhythm. No murmur


ABDOMEN: Soft, non-tender. Non-distended.


EXTREMITIES: Moves all 4 extremities spontaneously. No edema, normal radial and 

dorsalis pedis pulses bilaterally. No cyanosis.


BACK: no cervical, thoracic, lumbar midline tenderness. No saddle anesthesia, 

normal distal neurovascular exam. Moves all extremities in full range of motion.


NEUROLOGICAL: Alert and oriented x3. Normal speech. Cranial nerves II through 

XII grossly intact. 


PSYCH: Anxious and difficult to calm down


SKIN: Warm, dry, normal turgor. No rashes or lesions noted.





Course





- Re-evaluation


Re-evalutation: 


Patient began to become anxious and more tachypneic when talking about her 

symptoms, she does have right-sided chest pain on palpation, however when she 

talks she begins to have increased tachypnea and reports it is hard to breathe. 

 Performing x-ray, placing on the monitor, oxygen saturation unremarkable.  

Chest x-ray performed and unremarkable.  EKG unremarkable.  CBC, chemistry 

unremarkable, pregnancy test negative.





I did report her results, after this patient calm down.  Tachypnea actually 

completely resolved.  She still reports some right-sided chest pain, this is 

still reproducible.  She states pain is worse with a deep breath and that is why

 she felt like she had to breathe shorter and more rapidly.  Possible pleurisy 

component, nonspecific.  Because she has no lower extremity swelling, history of

 PE, recent travel or surgery, tachycardia, or hypoxia, I still have a low 

suspicion of pulmonary embolism.  Appears to be an anxiety component as well.  I

 did provide with muscle relaxer here for home, discussed treatments, provided 

with dexamethasone for possible pleurisy, discussed return precautions.  Patient

 smiling and pleasant, states full agreement and satisfaction with this plan at 

this time.  Stable at time of discharge with no tachypnea or abnormal symptoms 

noted.





- Vital Signs


Vital signs: 


                                        











Temp Pulse Resp BP Pulse Ox


 


 97.2 F   65   17   116/77   94 


 


 19 03:41  19 03:41  19 03:41  19 03:41  19 03:01














- Laboratory


Result Diagrams: 


                                 19 02:30





                                 19 02:30


Laboratory results interpreted by me: 


                                        











  19





  02:30 02:30


 


Plt Count  129 L 


 


Carbon Dioxide   21 L














Discharge





- Discharge


Clinical Impression: 


 Right-sided chest pain, Shortness of breath





Condition: Stable


Disposition: HOME, SELF-CARE


Additional Instructions: 


Your work-up and evaluation indicates inflammation of the chest wall and 

possible pleurisy as we discussed.


You have been treated for this, symptoms should gradually resolve.  If needed 

take over-the-counter anti-inflammatories, take the muscle relaxer especially to

 sleep.  He can apply heat over the area.





Stop smoking.  Follow-up with primary care.





Return if you worsen including fever, difficulty breathing, passing out, severe 

worsening pain, or any other concerning or worsening symptoms.





Prescriptions: 


Cyclobenzaprine HCl [Flexeril 5 mg Tablet] 1 - 2 tab PO TID PRN #15 tablet


 PRN Reason: 


Forms:  Return to Work


Referrals: 


NADIA MORENO MD [Primary Care Provider] - Follow up as needed

## 2019-09-11 NOTE — EKG REPORT
SEVERITY:- OTHERWISE NORMAL ECG -

SINUS BRADYCARDIA

:

Confirmed by: Jade Roberts MD 11-Sep-2019 09:26:00

## 2019-10-05 ENCOUNTER — HOSPITAL ENCOUNTER (EMERGENCY)
Dept: HOSPITAL 62 - ER | Age: 29
Discharge: HOME | End: 2019-10-05
Payer: MEDICAID

## 2019-10-05 VITALS — DIASTOLIC BLOOD PRESSURE: 50 MMHG | SYSTOLIC BLOOD PRESSURE: 95 MMHG

## 2019-10-05 DIAGNOSIS — F17.200: ICD-10-CM

## 2019-10-05 DIAGNOSIS — S00.93XA: Primary | ICD-10-CM
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
## 2019-10-05 DIAGNOSIS — R51: ICD-10-CM

## 2019-10-05 DIAGNOSIS — Z91.040: ICD-10-CM

## 2019-10-05 DIAGNOSIS — R56.9: ICD-10-CM

## 2019-10-05 LAB
ADD MANUAL DIFF: NO
ALBUMIN SERPL-MCNC: 3.7 G/DL (ref 3.5–5)
ALP SERPL-CCNC: 56 U/L (ref 38–126)
ANION GAP SERPL CALC-SCNC: 8 MMOL/L (ref 5–19)
AST SERPL-CCNC: 14 U/L (ref 14–36)
BASOPHILS # BLD AUTO: 0 10^3/UL (ref 0–0.2)
BASOPHILS NFR BLD AUTO: 0.6 % (ref 0–2)
BILIRUB DIRECT SERPL-MCNC: 0.1 MG/DL (ref 0–0.4)
BILIRUB SERPL-MCNC: 0.2 MG/DL (ref 0.2–1.3)
BUN SERPL-MCNC: 15 MG/DL (ref 7–20)
CALCIUM: 8.8 MG/DL (ref 8.4–10.2)
CHLORIDE SERPL-SCNC: 108 MMOL/L (ref 98–107)
CO2 SERPL-SCNC: 23 MMOL/L (ref 22–30)
EOSINOPHIL # BLD AUTO: 0.2 10^3/UL (ref 0–0.6)
EOSINOPHIL NFR BLD AUTO: 3.2 % (ref 0–6)
ERYTHROCYTE [DISTWIDTH] IN BLOOD BY AUTOMATED COUNT: 13.4 % (ref 11.5–14)
GLUCOSE SERPL-MCNC: 75 MG/DL (ref 75–110)
HCT VFR BLD CALC: 39.7 % (ref 36–47)
HGB BLD-MCNC: 13.2 G/DL (ref 12–15.5)
LYMPHOCYTES # BLD AUTO: 2.8 10^3/UL (ref 0.5–4.7)
LYMPHOCYTES NFR BLD AUTO: 36.6 % (ref 13–45)
MCH RBC QN AUTO: 30.1 PG (ref 27–33.4)
MCHC RBC AUTO-ENTMCNC: 33.3 G/DL (ref 32–36)
MCV RBC AUTO: 90 FL (ref 80–97)
MONOCYTES # BLD AUTO: 0.7 10^3/UL (ref 0.1–1.4)
MONOCYTES NFR BLD AUTO: 8.9 % (ref 3–13)
NEUTROPHILS # BLD AUTO: 3.9 10^3/UL (ref 1.7–8.2)
NEUTS SEG NFR BLD AUTO: 50.7 % (ref 42–78)
PLATELET # BLD: 109 10^3/UL (ref 150–450)
POTASSIUM SERPL-SCNC: 3.8 MMOL/L (ref 3.6–5)
PROT SERPL-MCNC: 6.1 G/DL (ref 6.3–8.2)
RBC # BLD AUTO: 4.39 10^6/UL (ref 3.72–5.28)
TOTAL CELLS COUNTED % (AUTO): 100 %
WBC # BLD AUTO: 7.7 10^3/UL (ref 4–10.5)

## 2019-10-05 PROCEDURE — 70450 CT HEAD/BRAIN W/O DYE: CPT

## 2019-10-05 PROCEDURE — 99284 EMERGENCY DEPT VISIT MOD MDM: CPT

## 2019-10-05 PROCEDURE — 80053 COMPREHEN METABOLIC PANEL: CPT

## 2019-10-05 PROCEDURE — 85025 COMPLETE CBC W/AUTO DIFF WBC: CPT

## 2019-10-05 PROCEDURE — 36415 COLL VENOUS BLD VENIPUNCTURE: CPT

## 2019-10-05 PROCEDURE — 83735 ASSAY OF MAGNESIUM: CPT

## 2019-10-05 NOTE — ER DOCUMENT REPORT
ED Seizure





- General


Chief Complaint: Head Injury


Stated Complaint: HEAD INJURY, BLURRED VISION


Time Seen by Provider: 10/05/19 09:13


Primary Care Provider: 


NADIA MORENO MD [Primary Care Provider] - Follow up as needed


Notes: 





Patient thinks she had a seizure.  She was hospitalized at this hospital this 

past spring due to the pneumonia and a collapsed lung and on a ventilator for a 

couple of weeks.  She says that she does not remember anything but was told that

she had several seizures while on the ventilator.  Subsequently, she has been 

seeing a local practitioner who has ordered MRI studies and told her that one 

side of her brain was larger than the other side.  She also is been told she has

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  She is scheduled to see a neurologist here

in town on Monday.  She thinks that she had 2 seizures, both times she had 

different colors in her vision and spots and then fell and hit her head.  Is 

complaining of pain to the left frontal scalp.  Does not have any hematomas th

ere.  Denies any neck pain.





Patient says that she has had daily headaches for 3 years.  Does not take 

prescription medications.  Has not had any recent illness or fever.





Patient is not on any medications for seizures.











- Related Data


Allergies/Adverse Reactions: 


                                        





latex [Latex] Allergy (Severe, Verified 10/05/19 07:27)


   swelling











Past Medical History





- Social History


Smoking Status: Current Every Day Smoker


Family History: Reviewed & Not Pertinent, CAD, CVA, DM, Hypertension, Malignancy


Patient has suicidal ideation: No


Patient has homicidal ideation: No





- Past Medical History


Cardiac Medical History: Reports: Hx Heart Murmur


Neurological Medical History: Reports: Hx Seizures


GI Medical History: Reports: Hx Gastroesophageal Reflux Disease, Hx Ulcer - 

gastric hx of


Psychiatric Medical History: 


Past Surgical History: Reports: Hx  Section - x 3, Hx Tubal Ligation





- Immunizations


Immunizations up to date: Yes


Hx Diphtheria, Pertussis, Tetanus Vaccination: Yes - 





Review of Systems





- Review of Systems


Notes: 





CONSTITUTIONAL :  Denies fever.


  


CARDIOVASCULAR:  Denies chest pain.





RESPIRATORY:  Denies cough, chest congestion, or shortness of breath.





GASTROINTESTINAL:  Denies abdominal pain or nausea, vomiting, or diarrhea.





GENITOURINARY:  Denies difficulty or painful urinating, urinary frequency, blood

 in urine.





Neuro: No deficits.  Headache daily for the past 3 years.





Physical Exam





- Vital signs


Vitals: 


                                        











Temp Pulse Resp BP Pulse Ox


 


 97.8 F   73   18   145/82 H  100 


 


 10/05/19 07:23  10/05/19 07:23  10/05/19 07:23  10/05/19 07:23  10/05/19 07:23











Interpretation: Normal.  No: Febrile


Notes: 





PHYSICAL EXAMINATION:





GENERAL: Well-appearing, no acute distress.





HEAD: Tender left frontal scalp, but otherwise atraumatic, normocephalic.  

Tongue not injured.





NECK: Normal range of motion, supple.





LUNGS: Breath sounds clear and equal bilaterally.





HEART: Regular rate and rhythm without murmurs heard.





ABDOMEN: Soft, nontender.  No guarding or rebound or masses felt.











Course





- Vital Signs


Vital signs: 


                                        











Temp Pulse Resp BP Pulse Ox


 


 97.9 F   77   16   95/50 L  97 


 


 10/05/19 12:33  10/05/19 12:33  10/05/19 12:33  10/05/19 12:33  10/05/19 12:33














- Laboratory


Result Diagrams: 


                                 10/05/19 10:20





                                 10/05/19 10:20


Laboratory results interpreted by me: 


                                        











  10/05/19 10/05/19





  10:20 10:20


 


Plt Count  109 L 


 


Chloride   108 H


 


Total Protein   6.1 L














- Diagnostic Test


Radiology reviewed: Image reviewed, Reports reviewed - CT Head normal





Discharge





- Discharge


Clinical Impression: 


 Contusion of head, Seizure





Condition: Stable


Disposition: HOME, SELF-CARE


Additional Instructions: 


Seizure





     You have probably had a seizure.  Seizure disorders (epilepsy) of one sort 

or another affect about one out of 50 people.  The seizure occurs because of 

abnormal electrical activity in the brain.


     Seizures may be due to drugs and alcohol, strokes, brain injury, or 

infection.  In the most common form of epilepsy, no cause can be found.  You 

will require further evaluation to determine the cause of your seizure, and to 

determine whether anti-seizure medication is required.  This follow-up testing 

is important, so please call us if you encounter problems with scheduling of te

sts or appointments.


     YOU SHOULD NOT DRIVE until released to do so by your physician. The law re

quires that seizures be reported to the 's license bureau--a seizure while

 driving could be catastrophic.


     Call the doctor if seizures recur, or if you develop new symptoms such as 

fever, severe headache, stiff neck, confusion or increasing sleepiness, weakness

 or numbness, or visual problems.








HEAD INJURY PRECAUTIONS:


     At this point, there is no evidence that your head injury is serious.  

Observation is necessary, however.


     Take only clear liquids for the first few hours, unless told otherwise by 

the doctor.  If no pain medication was prescribed, you may take acetaminophen 

according to the directions on the bottle.  Do not take any medication that may 

alter your level of alertness (unless you've discussed it with the doctor 

first).


      Limit activity for the first 24 hours.  Bed rest is best.  During the 

first 24 hours, check to see approximately every two to three hours that the 

patient is easily arousable, responds normally, and can perform common tasks 

such as walking without difficulty.


      Contact your doctor or go to the hospital if any of the following things 

occur: Persistent vomiting, difficulty in arousing the patient, worsening or 

continued headache, or failure to improve as expected.  Head injuries can cause 

symptoms that persist for a few days or even a few weeks.








NECK INJURY (CERVICAL STRAIN):


     You may have some have a neck strain.  This is an injury to the muscles and

 ligaments in the neck.  There is no evidence of a fracture of the neck bones.  

Also, no injury to the spinal cord or nerve roots was detected.


     Usually, stiffness and pain INCREASE for the first 24-48 hours after the 

injury.  The pain will gradually resolve and the neck will become more mobile.  

Most patients are back at work or school within a few days.  Typically, complete

 healing takes about two or three weeks.


     The usual initial treatment is rest and cold packs.  A neck collar may be 

placed to keep the muscles of the neck at rest. Antiinflammatory and muscle 

relaxing medication are often used to reduce the spasm and irritation.


     You should call the doctor, or go to the hospital, if you develop numbness 

or weakness in any extremity, problems with your bladder or bowel, or pain 

radiating down the arms.








CONTUSION:


    Your injury has resulted in a contusion -- a crushing of the deep tissues.  

No injury to important structures was detected during the physician's exam.  

Contusions vary in the amount of pain they cause, and in the length of time 

required for healing.  Typically, the area will become bruised, and will remain 

painful to touch for two or three weeks.  However, most patients are back to 

working and playing within a few days.


     After the initial period of rest and cold-packs, your symptoms (together 

with the doctor's recommendations) will determine how rapidly you can get back 

to full activity.  Usually this means "do what feels okay, but don't do things 

that hurt."


     If re-examination was recommended, it's important to follow up as 

instructed.  Call the doctor or return any time if pain increases, if swelling 

becomes severe, if you develop numbness or weakness in an injured extremity, or 

if any other alarming symptoms occur.





NORMAL EXAM AND WORKUP:


     At this time, your examination and workup show no significant abnormality. 

 No significant abnormal physical findings were noted.  All laboratory, EKG, and

 imaging (x-ray, CT scans, ultrasound) studies that were ordered show no 

significant abnormality.


     Although your examination and all studies that were ordered showed no 

significant abnormal finding, there are no examinations and no studies that are 

100% accurate.  There is always the possibility that some abnormality could 

exist and not be detected with physical examination or within the limits and 

capabilities of laboratory and other studies.


     You should return or follow up as you were instructed on your visit today 

for further evaluation if your symptoms do not resolve.








USE OF TYLENOL (ACETAMINOPHEN):


     Acetaminophen may be taken for pain relief or fever control. It's much 

safer than aspirin, offering a wider range of "safe" dosages.  It is safe during

 pregnancy.  Some brand names are Tylenol, Panadol, Datril, Anacin 3, Tempra, 

and Liquiprin. Acetaminophen can be repeated every four hours.  The following 

are maximum recommended dosages:





WEIGHT         Dose             Drops                  Elixir        

Chewable(80mg)


(LBS.)                            drprs=droppers    tsp=teaspoon





>89 pounds or adults          650 mg to 900 mg





Acetaminophen can be repeated every four hours.  Maximum dose not to exceed 4000

 mg a day.





   These maximum recommended dosages are slightly higher than the dosages 

written on the product container, but these dosages are very safe and below the 

toxic dosage for acetaminophen.





Your lab evaluation and your CT scan were both normal.





Follow-up with your neurologist as scheduled on Monday.





FOLLOW-UP CARE:


If you have been referred to a physician for follow-up care, call the 

physicians office for an appointment as you were instructed or within the next 

two days.  If you experience worsening or a significant change in your symptoms,

 notify the physician immediately or return to the Emergency Department at any 

time for re-evaluation.


Forms:  Return to Work


Referrals: 


EPPERLY,NADIA T, MD [Primary Care Provider] - Follow up as needed

## 2019-10-05 NOTE — RADIOLOGY REPORT (SQ)
EXAM DESCRIPTION:  CT HEAD WITHOUT



COMPLETED DATE/TIME:  10/5/2019 10:06 am



REASON FOR STUDY:  Patient fell and hit left head, Hx seizures



COMPARISON:  3/10/2019.



TECHNIQUE:  Axial images acquired through the brain without intravenous contrast.  Images reviewed wi
th bone, brain and subdural windows.  Additional sagittal and coronal reconstructions were generated.
 Images stored on PACS.

All CT scanners at this facility use dose modulation, iterative reconstruction, and/or weight based d
osing when appropriate to reduce radiation dose to as low as reasonably achievable (ALARA).

[facility]: Dose Right  [facility]: CareDose    [facility]: Dose Right    [facility]: Teradose 4D    [facility]: Smart Allani



RADIATION DOSE:  CT Rad equipment meets quality standard of care and radiation dose reduction techniq
ues were employed. CTDIvol: 53.2 mGy. DLP: 937 mGy-cm. mGy.



LIMITATIONS:  None.



FINDINGS:  VENTRICLES: Normal size and contour.

CEREBRUM: No masses.  No hemorrhage.  No midline shift.  No evidence for acute infarction. Normal gra
y/white matter differentiation. No areas of low density in the white matter.

CEREBELLUM: No masses.  No hemorrhage.  No alteration of density.  No evidence for acute infarction.

EXTRAAXIAL SPACES: No fluid collections.  No masses.

ORBITS AND GLOBE: No intra- or extraconal masses.  Normal contour of globe without masses.

CALVARIUM: No fracture.

PARANASAL SINUSES: No fluid or mucosal thickening.

SOFT TISSUES: No mass or hematoma.

OTHER: No other significant finding.



IMPRESSION:  NORMAL BRAIN CT WITHOUT CONTRAST.

EVIDENCE OF ACUTE STROKE: NO.



COMMENT:  Quality ID # 436: Final reports with documentation of one or more dose reduction techniques
 (e.g., Automated exposure control, adjustment of the mA and/or kV according to patient size, use of 
iterative reconstruction technique)



TECHNICAL DOCUMENTATION:  JOB ID:  [id number]

 2011 [employer]- All Rights Reserved



Reading location - IP/workstation name: [email or web address]

## 2019-12-14 ENCOUNTER — HOSPITAL ENCOUNTER (EMERGENCY)
Dept: HOSPITAL 62 - ER | Age: 29
Discharge: HOME | End: 2019-12-14
Payer: MEDICAID

## 2019-12-14 VITALS — DIASTOLIC BLOOD PRESSURE: 63 MMHG | SYSTOLIC BLOOD PRESSURE: 119 MMHG

## 2019-12-14 DIAGNOSIS — K92.1: ICD-10-CM

## 2019-12-14 DIAGNOSIS — Z87.11: ICD-10-CM

## 2019-12-14 DIAGNOSIS — F17.200: ICD-10-CM

## 2019-12-14 DIAGNOSIS — R10.31: ICD-10-CM

## 2019-12-14 DIAGNOSIS — R10.13: ICD-10-CM

## 2019-12-14 DIAGNOSIS — R10.811: ICD-10-CM

## 2019-12-14 DIAGNOSIS — R10.11: Primary | ICD-10-CM

## 2019-12-14 DIAGNOSIS — Z87.19: ICD-10-CM

## 2019-12-14 DIAGNOSIS — R10.813: ICD-10-CM

## 2019-12-14 DIAGNOSIS — Z91.040: ICD-10-CM

## 2019-12-14 LAB
ADD MANUAL DIFF: NO
ALBUMIN SERPL-MCNC: 4.5 G/DL (ref 3.5–5)
ALP SERPL-CCNC: 60 U/L (ref 38–126)
ANION GAP SERPL CALC-SCNC: 10 MMOL/L (ref 5–19)
APPEARANCE UR: (no result)
APTT PPP: YELLOW S
AST SERPL-CCNC: 19 U/L (ref 14–36)
BASOPHILS # BLD AUTO: 0 10^3/UL (ref 0–0.2)
BASOPHILS NFR BLD AUTO: 0.4 % (ref 0–2)
BILIRUB DIRECT SERPL-MCNC: 0 MG/DL (ref 0–0.4)
BILIRUB SERPL-MCNC: 0.8 MG/DL (ref 0.2–1.3)
BILIRUB UR QL STRIP: NEGATIVE
BUN SERPL-MCNC: 12 MG/DL (ref 7–20)
CALCIUM: 9.8 MG/DL (ref 8.4–10.2)
CHLORIDE SERPL-SCNC: 105 MMOL/L (ref 98–107)
CO2 SERPL-SCNC: 26 MMOL/L (ref 22–30)
EOSINOPHIL # BLD AUTO: 0.2 10^3/UL (ref 0–0.6)
EOSINOPHIL NFR BLD AUTO: 2.4 % (ref 0–6)
ERYTHROCYTE [DISTWIDTH] IN BLOOD BY AUTOMATED COUNT: 13.2 % (ref 11.5–14)
GLUCOSE SERPL-MCNC: 84 MG/DL (ref 75–110)
GLUCOSE UR STRIP-MCNC: NEGATIVE MG/DL
HCT VFR BLD CALC: 42.9 % (ref 36–47)
HGB BLD-MCNC: 14.5 G/DL (ref 12–15.5)
KETONES UR STRIP-MCNC: NEGATIVE MG/DL
LYMPHOCYTES # BLD AUTO: 2.6 10^3/UL (ref 0.5–4.7)
LYMPHOCYTES NFR BLD AUTO: 29.2 % (ref 13–45)
MCH RBC QN AUTO: 30.9 PG (ref 27–33.4)
MCHC RBC AUTO-ENTMCNC: 33.9 G/DL (ref 32–36)
MCV RBC AUTO: 91 FL (ref 80–97)
MONOCYTES # BLD AUTO: 0.8 10^3/UL (ref 0.1–1.4)
MONOCYTES NFR BLD AUTO: 9 % (ref 3–13)
NEUTROPHILS # BLD AUTO: 5.2 10^3/UL (ref 1.7–8.2)
NEUTS SEG NFR BLD AUTO: 59 % (ref 42–78)
NITRITE UR QL STRIP: NEGATIVE
PH UR STRIP: 5 [PH] (ref 5–9)
PLATELET # BLD: 126 10^3/UL (ref 150–450)
POTASSIUM SERPL-SCNC: 4.3 MMOL/L (ref 3.6–5)
PROT SERPL-MCNC: 7.5 G/DL (ref 6.3–8.2)
PROT UR STRIP-MCNC: NEGATIVE MG/DL
RBC # BLD AUTO: 4.7 10^6/UL (ref 3.72–5.28)
SP GR UR STRIP: 1.03
TOTAL CELLS COUNTED % (AUTO): 100 %
UROBILINOGEN UR-MCNC: 2 MG/DL (ref ?–2)
WBC # BLD AUTO: 8.8 10^3/UL (ref 4–10.5)

## 2019-12-14 PROCEDURE — 85025 COMPLETE CBC W/AUTO DIFF WBC: CPT

## 2019-12-14 PROCEDURE — 81001 URINALYSIS AUTO W/SCOPE: CPT

## 2019-12-14 PROCEDURE — 74177 CT ABD & PELVIS W/CONTRAST: CPT

## 2019-12-14 PROCEDURE — 76705 ECHO EXAM OF ABDOMEN: CPT

## 2019-12-14 PROCEDURE — 96375 TX/PRO/DX INJ NEW DRUG ADDON: CPT

## 2019-12-14 PROCEDURE — 96361 HYDRATE IV INFUSION ADD-ON: CPT

## 2019-12-14 PROCEDURE — 36415 COLL VENOUS BLD VENIPUNCTURE: CPT

## 2019-12-14 PROCEDURE — 80053 COMPREHEN METABOLIC PANEL: CPT

## 2019-12-14 PROCEDURE — 96374 THER/PROPH/DIAG INJ IV PUSH: CPT

## 2019-12-14 PROCEDURE — 99284 EMERGENCY DEPT VISIT MOD MDM: CPT

## 2019-12-14 NOTE — RADIOLOGY REPORT (SQ)
EXAM DESCRIPTION:  CT ABD/PELVIS WITH IV ONLY



COMPLETED DATE/TIME:  12/14/2019 4:52 pm



REASON FOR STUDY:  RLQ abd pain



COMPARISON:  5/18/2018



TECHNIQUE:  CT scan of the abdomen and pelvis performed using helical scanning technique with dynamic
 intravenous contrast injection.  No oral contrast. Images reviewed with lung, soft tissue, and bone 
windows. Reconstructed coronal and sagittal MPR images reviewed. Delayed images for evaluation of the
 urinary system also acquired. All images stored on PACS.

All CT scanners at this facility use dose modulation, iterative reconstruction, and/or weight based d
osing when appropriate to reduce radiation dose to as low as reasonably achievable (ALARA).

[facility]: Dose Right  [facility]: CareDose    [facility]: Dose Right    [facility]: Teradose 4D    [facility]: Smart Technologies



CONTRAST TYPE AND DOSE:  contrast/concentration: Isovue 350.00 mg/ml; Total Contrast Delivered: 74.0 
ml; Total Saline Delivered: 67.0 ml



RENAL FUNCTION:  BUN 12; creatinine 0.63



RADIATION DOSE:  CT Rad equipment meets quality standard of care and radiation dose reduction techniq
ues were employed. CTDIvol: 5.6 - 7.5 mGy. DLP: 640 mGy-cm..



LIMITATIONS:  None.



FINDINGS:  LOWER CHEST: 2 5 mm subpleural pulmonary nodules are seen within the left lower lobe.  No 
pleural effusions.  No acute findings.

LIVER: Normal size. No masses.  No dilated ducts.

SPLEEN: Normal size. No focal lesions.

PANCREAS: No masses. No significant calcifications. No adjacent inflammation or peripancreatic fluid 
collections. Pancreatic duct not dilated.

GALLBLADDER: No identified stones by CT criteria. No inflammatory changes to suggest cholecystitis.

ADRENAL GLANDS: No significant masses or asymmetry.

RIGHT KIDNEY AND URETER: No solid masses.   No significant calcifications.   No hydronephrosis or hyd
roureter.

LEFT KIDNEY AND URETER: No solid masses.   No significant calcifications.   No hydronephrosis or hydr
oureter.

AORTA AND VESSELS: No aneurysm. No dissection. Renal arteries, SMA, celiac without stenosis.

RETROPERITONEUM: No retroperitoneal adenopathy, hemorrhage or masses.

BOWEL AND PERITONEAL CAVITY: No masses or inflammatory changes. No free fluid or peritoneal masses.

APPENDIX: Normal.

PELVIS: No mass.  No free fluid. Normal bladder.  Status post tubal ligation.  The uterus and adnexa 
appear normal for modality/technique.

ABDOMINAL WALL: Tiny fat containing umbilical hernia.

BONES: No significant or acute findings.

OTHER: No other significant finding.



IMPRESSION:  No evidence of acute intra- abdominal infectious/ inflammatory process.  Specifically, n
o findings to correlate to the patient's reported right lower quadrant pain.



TECHNICAL DOCUMENTATION:  JOB ID:  [id number]

Quality ID # 436: Final reports with documentation of one or more dose reduction techniques (e.g., Au
tomated exposure control, adjustment of the mA and/or kV according to patient size, use of iterative 
reconstruction technique)

 2011 [employer]- All Rights Reserved



Reading location - IP/workstation name: [email or web address]

## 2019-12-14 NOTE — RADIOLOGY REPORT (SQ)
EXAM DESCRIPTION:  U/S ABDOMEN LIMITED W/O DOP



COMPLETED DATE/TIME:  12/14/2019 7:04 pm



REASON FOR STUDY:  RUQ pain



COMPARISON:  3/14/2016



TECHNIQUE:  Dynamic and static grayscale images acquired of the abdomen and recorded on PACS. [employer]
bhaskar selected color Doppler and spectral images recorded.



LIMITATIONS:  None.



FINDINGS:  PANCREAS: No masses.  Visualized pancreatic duct normal caliber.

LIVER: No masses. Echotexture normal.

LIVER VASCULATURE: Normal directional flow of the main portal vein and hepatic veins.

GALLBLADDER: No stones. Normal wall thickness. No pericholecystic fluid.

ULTRASOUND-DETECTED ARIZMENDI'S SIGN: Negative.

INTRAHEPATIC DUCTS AND COMMON DUCT: CBD and intrahepatic ducts normal caliber. No filling defects.

INFERIOR VENA CAVA: Normal flow.

AORTA: No aneurysm.

RIGHT KIDNEY:  Normal size. Normal echogenicity. No solid or suspicious masses. No hydronephrosis. No
 calcifications.

PERITONEAL AND RIGHT PLEURAL SPACE: No ascites or effusions.

OTHER: No other significant findings.



IMPRESSION:  NORMAL RIGHT UPPER QUADRANT ULTRASOUND.



TECHNICAL DOCUMENTATION:  JOB ID:  [id number]

 [employer]- All Rights Reserved



Reading location - IP/workstation name: [email or web address]

## 2019-12-14 NOTE — ER DOCUMENT REPORT
ED GI/





- General


Chief Complaint: Flank Pain


Stated Complaint: RIGHT SIDE PAIN


Time Seen by Provider: 19 13:46


Primary Care Provider: 


NADIA MORENO MD [Primary Care Provider] - Follow up as needed


Mode of Arrival: Ambulatory


Information source: Patient


Notes: 





29-year-old female patient presenting to the emergency department chief 

complaint of right upper quadrant or right lower quadrant abdominal pain.  

Patient reports this is been ongoing for 2 days.  She denies any fever, body 

aches or chills.  She does report bloody stools but states that is been going on

for several months.  She states she is currently being seen by her primary care 

being worked up for Crohn's.  She denies any previous surgeries on her abdomen. 

She states that she is unable to eat as every time she swallows any food she had

severe pain in the right upper quadrant and epigastric area.





TRAVEL OUTSIDE OF THE [place] IN LAST 30 DAYS: No





- Related Data


Allergies/Adverse Reactions: 


                                        





latex [Latex] Allergy (Severe, Verified 19 13:38)


   swelling











Past Medical History





- General


Information source: Patient





- Social History


Smoking Status: Current Every Day Smoker


Chew tobacco use (# tins/day): No


Frequency of alcohol use: None


Drug Abuse: None


Family History: Reviewed & Not Pertinent, CAD, CVA, DM, Hypertension, Malignancy


Patient has suicidal ideation: No


Patient has homicidal ideation: No





- Past Medical History


Cardiac Medical History: Reports: Hx Heart Murmur


   Denies: Hx Coronary Artery Disease, Hx Hypertension


Pulmonary Medical History: 


   Denies: Hx Asthma, Hx COPD


Neurological Medical History: Reports: Hx Seizures.  Denies: Hx Cerebrovascular 

Accident


Endocrine Medical History: Denies: Hx Diabetes Mellitus Type 1, Hx Diabetes 

Mellitus Type 2


Renal/ Medical History: Denies: Hx Peritoneal Dialysis


GI Medical History: Reports: Hx Gastroesophageal Reflux Disease, Hx Ulcer - 

gastric hx of.  Denies: Hx Hiatal Hernia


Musculoskeletal Medical History: Denies Hx Arthritis, Denies Hx Gout


Skin Medical History: Denies Hx Eczema, Denies Hx Psoriasis


Psychiatric Medical History: 


Infectious Medical History: Denies: Hx HIV


Past Surgical History: Reports: Hx  Section - x 3, Hx Tubal Ligation





- Immunizations


Immunizations up to date: Yes


Hx Diphtheria, Pertussis, Tetanus Vaccination: Yes - 





Review of Systems





- Review of Systems


Constitutional: No symptoms reported


EENT: No symptoms reported


Cardiovascular: No symptoms reported


Respiratory: No symptoms reported


Gastrointestinal: Abdominal pain, Blood streaked bowels


Genitourinary: No symptoms reported


Female Genitourinary: No symptoms reported


Musculoskeletal: No symptoms reported


Skin: No symptoms reported


Hematologic/Lymphatic: No symptoms reported


Neurological/Psychological: No symptoms reported





Physical Exam





- Vital signs


Vitals: 


                                        











Temp Pulse Resp BP Pulse Ox


 


 98.3 F   79   18   143/78 H  98 


 


 19 13:15  19 13:15  19 13:15  19 13:15  19 13:15














- Notes


Notes: 





PHYSICAL EXAMINATION:





GENERAL: Well-appearing, well-nourished and in no acute distress.





HEAD: Atraumatic, normocephalic.





EYES: Pupils equal round and reactive to light, extraocular movements intact, 

conjunctiva are normal.





ENT: Nares patent, oropharynx clear without exudates.  Moist mucous membranes.





NECK: Normal range of motion, supple without lymphadenopathy





LUNGS: Breath sounds clear to auscultation bilaterally and equal.  No wheezes 

rales or rhonchi.





HEART: Regular rate and rhythm without murmurs





ABDOMEN: Soft, nondistended abdomen.  Tenderness to palpation of right upper and

 right lower quadrants.  No masses appreciated.





Female : Mild right CVA tenderness.





Musculoskeletal: Normal range of motion, no pitting or edema.  No cyanosis.





NEUROLOGICAL: Cranial nerves grossly intact.  Normal speech, normal gait.  

Normal sensory, motor exams





PSYCH: Normal mood, normal affect.





SKIN: Warm, Dry, normal turgor, no rashes or lesions noted.





Course





- Re-evaluation


Re-evalutation: 








Laboratory











  19





  14:03 14:03 14:03


 


WBC  8.8  


 


RBC  4.70  


 


Hgb  14.5  


 


Hct  42.9  


 


MCV  91  


 


MCH  30.9  


 


MCHC  33.9  


 


RDW  13.2  


 


Plt Count  126 L  


 


Lymph % (Auto)  29.2  


 


Mono % (Auto)  9.0  


 


Eos % (Auto)  2.4  


 


Baso % (Auto)  0.4  


 


Absolute Neuts (auto)  5.2  


 


Absolute Lymphs (auto)  2.6  


 


Absolute Monos (auto)  0.8  


 


Absolute Eos (auto)  0.2  


 


Absolute Basos (auto)  0.0  


 


Seg Neutrophils %  59.0  


 


Sodium   141.4 


 


Potassium   4.3 


 


Chloride   105 


 


Carbon Dioxide   26 


 


Anion Gap   10 


 


BUN   12 


 


Creatinine   0.63 


 


Est GFR ( Amer)   > 60 


 


Est GFR (MDRD) Non-Af   > 60 


 


Glucose   84 


 


Calcium   9.8 


 


Total Bilirubin   0.8 


 


Direct Bilirubin   0.0 


 


Neonat Total Bilirubin   Not Reportable 


 


Neonat Direct Bilirubin   Not Reportable 


 


Neonat Indirect Bili   Not Reportable 


 


AST   19 


 


ALT   13 


 


Alkaline Phosphatase   60 


 


Total Protein   7.5 


 


Albumin   4.5 


 


Urine Color    YELLOW


 


Urine Appearance    SLIGHTLY-CLOUDY


 


Urine pH    5.0


 


Ur Specific Gravity    1.035


 


Urine Protein    NEGATIVE


 


Urine Glucose (UA)    NEGATIVE


 


Urine Ketones    NEGATIVE


 


Urine Blood    NEGATIVE


 


Urine Nitrite    NEGATIVE


 


Urine Bilirubin    NEGATIVE


 


Urine Urobilinogen    2.0 H


 


Ur Leukocyte Esterase    TRACE H


 


Urine WBC (Auto)    4


 


Urine RBC (Auto)    2


 


Squamous Epi Cells Auto    3


 


Urine Mucus (Auto)    MANY


 


Urine Ascorbic Acid    NEGATIVE











                                        





Abdomen/Pelvis CT  19 13:55


IMPRESSION:  No evidence of acute intra- abdominal infectious/ inflammatory 

process.  Specifically, no findings to correlate to the patient's reported right

 lower quadrant pain.


 








Abdomen Ultrasound  19 17:48


IMPRESSION:  NORMAL RIGHT UPPER QUADRANT ULTRASOUND.


 








Work-up today has been unremarkable.  Again patient is currently being worked up

 for Crohn's by her primary care physician.  I feel this is most likely the 

cause of her abdominal pain today.  She will be started on a course of steroids 

and she will follow-up with primary care.  ED return precautions discussed, 

patient verbalized understanding and agreement with same.








- Vital Signs


Vital signs: 


                                        











Temp Pulse Resp BP Pulse Ox


 


 97.8 F   72   20   113/39 L  96 


 


 19 19:53  19 19:53  19 19:53  19 19:53  19 19:53














- Laboratory


Result Diagrams: 


                                 19 14:03





                                 19 14:03


Laboratory results interpreted by me: 


                                        











  19





  14:03 14:03


 


Plt Count  126 L 


 


Urine Urobilinogen   2.0 H


 


Ur Leukocyte Esterase   TRACE H














Discharge





- Discharge


Clinical Impression: 


Abdominal pain


Qualifiers:


 Abdominal location: unspecified location Qualified Code(s): R10.9 - Unspecified

 abdominal pain





Condition: Stable


Disposition: HOME, SELF-CARE


Additional Instructions: 


Abdominal Pain





     There are many causes of abdominal pain.  Pain can mean a serious problem 

requiring surgery (such as appendicitis). It can also be an innocent problem 

that goes away on its own (such as a viral infection).  Often, time must pass to

 determine the cause of pain.


     The physician does not feel that hospitalization is necessary, at present. 

Things may change within the next 24 hours. Call the doctor or come back for re-

examination if any problems occur, such as:


     (1) Pain that becomes more severe, steady, or becomes concentrated in one 

specific area.  Also, pain that is more severe with movement or coughing.  


     (2) Vomiting that persists or becomes more frequent.  


     (3) Blood in the vomitus, urine, or bowel movements.  Blood in the stool m

ay have a tarry or black appearance.


     (4) Shaking chills or fever greater than 100 degrees F. 


     (5) The abdomen becomes more distended or swollen. 


     (6) Bowel movements cease. 


     (7) Failure to improve as expected.








Your work-up today did not reveal any acute abnormalities.  I do suggest you 

continue following up with your primary care for your Crohn's work-up.  I am 

starting on some steroids as this would help if this is a Crohn's flareup which 

is most likely the cause of your pain and, bloody stools.








Prescriptions: 


Dicyclomine HCl [Bentyl 20 mg Tablet] 20 mg PO QID #20 tablet


Prednisone [Sterapred Ds] 1 pkg PO ASDIR PRN 12 Days  tab.ds.pk


 PRN Reason: 


Referrals: 


NADIA MORENO MD [Primary Care Provider] - Follow up as needed

## 2019-12-14 NOTE — ER DOCUMENT REPORT
ED Medical Screen (RME)





- General


Chief Complaint: Flank Pain


Stated Complaint: RIGHT SIDE PAIN


Time Seen by Provider: 19 13:46


Primary Care Provider: 


NADIA MORENO MD [Primary Care Provider] - Follow up as needed


Notes: 





Patient is a 29-year-old female who presents to the emergency department with a 

chief complaint of abdominal pain.  Patient states that her symptoms started 2 

days ago.  She denies any fever, body aches, or any chills.  States that the 

pain is in her right lower quadrant and right upper quadrant.  Patient states 

that she is unable to eat.  Denies any nausea, vomiting, or diarrhea.  No 

previous abdominal surgeries.  Denies any vaginal discharge.





Exam: Very tender right lower quadrant abdomen.


TRAVEL OUTSIDE OF THE [place] IN LAST 30 DAYS: No





- Related Data


Allergies/Adverse Reactions: 


                                        





latex [Latex] Allergy (Severe, Verified 19 13:38)


   swelling











Past Medical History





- Social History


Chew tobacco use (# tins/day): No


Frequency of alcohol use: None


Drug Abuse: None





- Past Medical History


Cardiac Medical History: Reports: Hx Heart Murmur


   Denies: Hx Coronary Artery Disease, Hx Hypertension


Pulmonary Medical History: 


   Denies: Hx Asthma, Hx COPD


Neurological Medical History: Reports: Hx Seizures.  Denies: Hx Cerebrovascular 

Accident


Endocrine Medical History: Denies: Hx Diabetes Mellitus Type 1, Hx Diabetes 

Mellitus Type 2


Renal/ Medical History: Denies: Hx Peritoneal Dialysis


GI Medical History: Reports: Hx Gastroesophageal Reflux Disease, Hx Ulcer - 

gastric hx of.  Denies: Hx Hiatal Hernia


Musculoskeltal Medical History: Denies Hx Arthritis, Denies Hx Gout


Skin Medical History: Denies Hx Eczema, Denies Hx Psoriasis


Psychiatric Medical History: 


Infectious Medical History: Denies: Hx HIV


Past Surgical History: Reports: Hx  Section - x 3, Hx Tubal Ligation





- Immunizations


Immunizations up to date: Yes


Hx Diphtheria, Pertussis, Tetanus Vaccination: Yes - 





Physical Exam





- Vital signs


Vitals: 





                                        











Temp Pulse Resp BP Pulse Ox


 


 98.3 F   79   18   143/78 H  98 


 


 19 13:15  19 13:15  19 13:15  19 13:15  19 13:15














Course





- Vital Signs


Vital signs: 





                                        











Temp Pulse Resp BP Pulse Ox


 


 98.3 F   79   18   143/78 H  98 


 


 12/14/19 13:39  19 13:39  19 13:39  19 13:39  19 13:39














Doctor's Discharge





- Discharge


Referrals: 


NADIA MORENO MD [Primary Care Provider] - Follow up as needed

## 2020-03-04 ENCOUNTER — HOSPITAL ENCOUNTER (EMERGENCY)
Dept: HOSPITAL 62 - ER | Age: 30
LOS: 1 days | Discharge: LEFT BEFORE DISCHARGE | End: 2020-03-05
Payer: MEDICAID

## 2020-03-04 VITALS — SYSTOLIC BLOOD PRESSURE: 141 MMHG | DIASTOLIC BLOOD PRESSURE: 76 MMHG

## 2020-03-04 DIAGNOSIS — F17.210: ICD-10-CM

## 2020-03-04 DIAGNOSIS — T76.21XA: Primary | ICD-10-CM
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
## 2020-03-04 DIAGNOSIS — A59.00: ICD-10-CM

## 2020-03-04 PROCEDURE — 85025 COMPLETE CBC W/AUTO DIFF WBC: CPT

## 2020-03-04 PROCEDURE — 99281 EMR DPT VST MAYX REQ PHY/QHP: CPT

## 2020-03-04 PROCEDURE — 86592 SYPHILIS TEST NON-TREP QUAL: CPT

## 2020-03-04 PROCEDURE — 87491 CHLMYD TRACH DNA AMP PROBE: CPT

## 2020-03-04 PROCEDURE — 80053 COMPREHEN METABOLIC PANEL: CPT

## 2020-03-04 PROCEDURE — 80074 ACUTE HEPATITIS PANEL: CPT

## 2020-03-04 PROCEDURE — 87591 N.GONORRHOEAE DNA AMP PROB: CPT

## 2020-03-04 PROCEDURE — 86701 HIV-1ANTIBODY: CPT

## 2020-03-04 PROCEDURE — 87210 SMEAR WET MOUNT SALINE/INK: CPT

## 2020-03-04 PROCEDURE — 36415 COLL VENOUS BLD VENIPUNCTURE: CPT
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
## 2020-03-05 LAB
ADD MANUAL DIFF: NO
ALBUMIN SERPL-MCNC: 4.2 G/DL (ref 3.5–5)
ALP SERPL-CCNC: 68 U/L (ref 38–126)
ANION GAP SERPL CALC-SCNC: 8 MMOL/L (ref 5–19)
AST SERPL-CCNC: 13 U/L (ref 14–36)
BACTERIA (WET MOUNT): (no result)
BASOPHILS # BLD AUTO: 0 10^3/UL (ref 0–0.2)
BASOPHILS NFR BLD AUTO: 0.5 % (ref 0–2)
BILIRUB DIRECT SERPL-MCNC: 0 MG/DL (ref 0–0.4)
BILIRUB SERPL-MCNC: 0.5 MG/DL (ref 0.2–1.3)
BUN SERPL-MCNC: 19 MG/DL (ref 7–20)
CALCIUM: 9.2 MG/DL (ref 8.4–10.2)
CHLAM PCR: NOT DETECTED
CHLORIDE SERPL-SCNC: 108 MMOL/L (ref 98–107)
CO2 SERPL-SCNC: 23 MMOL/L (ref 22–30)
EOSINOPHIL # BLD AUTO: 0.2 10^3/UL (ref 0–0.6)
EOSINOPHIL NFR BLD AUTO: 2.2 % (ref 0–6)
EPITHELIALS (WET MOUNT): (no result)
ERYTHROCYTE [DISTWIDTH] IN BLOOD BY AUTOMATED COUNT: 13.4 % (ref 11.5–14)
GLUCOSE SERPL-MCNC: 80 MG/DL (ref 75–110)
HCT VFR BLD CALC: 39 % (ref 36–47)
HGB BLD-MCNC: 13.2 G/DL (ref 12–15.5)
LYMPHOCYTES # BLD AUTO: 2.6 10^3/UL (ref 0.5–4.7)
LYMPHOCYTES NFR BLD AUTO: 33.7 % (ref 13–45)
MCH RBC QN AUTO: 30.7 PG (ref 27–33.4)
MCHC RBC AUTO-ENTMCNC: 34 G/DL (ref 32–36)
MCV RBC AUTO: 90 FL (ref 80–97)
MONOCYTES # BLD AUTO: 0.7 10^3/UL (ref 0.1–1.4)
MONOCYTES NFR BLD AUTO: 8.7 % (ref 3–13)
NEUTROPHILS # BLD AUTO: 4.2 10^3/UL (ref 1.7–8.2)
NEUTS SEG NFR BLD AUTO: 54.9 % (ref 42–78)
PLATELET # BLD: 153 10^3/UL (ref 150–450)
POTASSIUM SERPL-SCNC: 3.4 MMOL/L (ref 3.6–5)
PROT SERPL-MCNC: 6.9 G/DL (ref 6.3–8.2)
RBC # BLD AUTO: 4.31 10^6/UL (ref 3.72–5.28)
RBCS (WET MOUNT): (no result)
T.VAGINALIS (WET MOUNT): (no result)
TOTAL CELLS COUNTED % (AUTO): 100 %
WBC # BLD AUTO: 7.6 10^3/UL (ref 4–10.5)
WBCS (WET MOUNT): (no result)
YEAST (WET MOUNT): (no result)

## 2020-03-06 LAB — HEPATITIS C VIRUS ANTIBODY: <0.1 S/CO RATIO (ref 0–0.9)

## 2020-05-27 ENCOUNTER — HOSPITAL ENCOUNTER (EMERGENCY)
Dept: HOSPITAL 62 - ER | Age: 30
Discharge: HOME | End: 2020-05-27
Payer: MEDICAID

## 2020-05-27 VITALS — DIASTOLIC BLOOD PRESSURE: 68 MMHG | SYSTOLIC BLOOD PRESSURE: 118 MMHG

## 2020-05-27 DIAGNOSIS — Z88.8: ICD-10-CM

## 2020-05-27 DIAGNOSIS — F17.200: ICD-10-CM

## 2020-05-27 DIAGNOSIS — R35.0: ICD-10-CM

## 2020-05-27 DIAGNOSIS — N30.01: Primary | ICD-10-CM

## 2020-05-27 DIAGNOSIS — M54.9: ICD-10-CM

## 2020-05-27 DIAGNOSIS — N93.9: ICD-10-CM

## 2020-05-27 DIAGNOSIS — R10.9: ICD-10-CM

## 2020-05-27 DIAGNOSIS — R07.81: ICD-10-CM

## 2020-05-27 LAB
ADD MANUAL DIFF: NO
ALBUMIN SERPL-MCNC: 4.4 G/DL (ref 3.5–5)
ALP SERPL-CCNC: 65 U/L (ref 38–126)
ANION GAP SERPL CALC-SCNC: 9 MMOL/L (ref 5–19)
APPEARANCE UR: (no result)
APTT PPP: YELLOW S
AST SERPL-CCNC: 15 U/L (ref 14–36)
BASOPHILS # BLD AUTO: 0 10^3/UL (ref 0–0.2)
BASOPHILS NFR BLD AUTO: 0.4 % (ref 0–2)
BILIRUB DIRECT SERPL-MCNC: 0 MG/DL (ref 0–0.4)
BILIRUB SERPL-MCNC: 0.5 MG/DL (ref 0.2–1.3)
BILIRUB UR QL STRIP: NEGATIVE
BUN SERPL-MCNC: 13 MG/DL (ref 7–20)
CALCIUM: 10.1 MG/DL (ref 8.4–10.2)
CHLORIDE SERPL-SCNC: 107 MMOL/L (ref 98–107)
CO2 SERPL-SCNC: 24 MMOL/L (ref 22–30)
EOSINOPHIL # BLD AUTO: 0.1 10^3/UL (ref 0–0.6)
EOSINOPHIL NFR BLD AUTO: 1.4 % (ref 0–6)
ERYTHROCYTE [DISTWIDTH] IN BLOOD BY AUTOMATED COUNT: 14.3 % (ref 11.5–14)
GLUCOSE SERPL-MCNC: 86 MG/DL (ref 75–110)
GLUCOSE UR STRIP-MCNC: NEGATIVE MG/DL
HCT VFR BLD CALC: 41.2 % (ref 36–47)
HGB BLD-MCNC: 14.1 G/DL (ref 12–15.5)
KETONES UR STRIP-MCNC: NEGATIVE MG/DL
LYMPHOCYTES # BLD AUTO: 2.3 10^3/UL (ref 0.5–4.7)
LYMPHOCYTES NFR BLD AUTO: 24.7 % (ref 13–45)
MCH RBC QN AUTO: 30.6 PG (ref 27–33.4)
MCHC RBC AUTO-ENTMCNC: 34.3 G/DL (ref 32–36)
MCV RBC AUTO: 89 FL (ref 80–97)
MONOCYTES # BLD AUTO: 0.8 10^3/UL (ref 0.1–1.4)
MONOCYTES NFR BLD AUTO: 8.4 % (ref 3–13)
NEUTROPHILS # BLD AUTO: 5.9 10^3/UL (ref 1.7–8.2)
NEUTS SEG NFR BLD AUTO: 65.1 % (ref 42–78)
NITRITE UR QL STRIP: NEGATIVE
PH UR STRIP: 9 [PH] (ref 5–9)
PLATELET # BLD: 121 10^3/UL (ref 150–450)
POTASSIUM SERPL-SCNC: 3.9 MMOL/L (ref 3.6–5)
PROT SERPL-MCNC: 7.3 G/DL (ref 6.3–8.2)
PROT UR STRIP-MCNC: NEGATIVE MG/DL
RBC # BLD AUTO: 4.62 10^6/UL (ref 3.72–5.28)
SP GR UR STRIP: 1.01
TOTAL CELLS COUNTED % (AUTO): 100 %
UROBILINOGEN UR-MCNC: NEGATIVE MG/DL (ref ?–2)
WBC # BLD AUTO: 9.1 10^3/UL (ref 4–10.5)

## 2020-05-27 PROCEDURE — 87088 URINE BACTERIA CULTURE: CPT

## 2020-05-27 PROCEDURE — 80053 COMPREHEN METABOLIC PANEL: CPT

## 2020-05-27 PROCEDURE — 36415 COLL VENOUS BLD VENIPUNCTURE: CPT

## 2020-05-27 PROCEDURE — 87086 URINE CULTURE/COLONY COUNT: CPT

## 2020-05-27 PROCEDURE — 85025 COMPLETE CBC W/AUTO DIFF WBC: CPT

## 2020-05-27 PROCEDURE — 99283 EMERGENCY DEPT VISIT LOW MDM: CPT

## 2020-05-27 PROCEDURE — 74176 CT ABD & PELVIS W/O CONTRAST: CPT

## 2020-05-27 PROCEDURE — 81001 URINALYSIS AUTO W/SCOPE: CPT

## 2020-05-27 PROCEDURE — 96372 THER/PROPH/DIAG INJ SC/IM: CPT

## 2020-05-27 PROCEDURE — 84703 CHORIONIC GONADOTROPIN ASSAY: CPT

## 2020-05-27 NOTE — RADIOLOGY REPORT (SQ)
EXAM DESCRIPTION:  CT ABD/PELVIS NO ORAL OR IV



IMAGES COMPLETED DATE/TIME:  5/27/2020 5:44 pm



REASON FOR STUDY:  Left flank pain



COMPARISON:  5/23/2016



TECHNIQUE:  CT scan of the abdomen and pelvis performed without intravenous or oral contrast. Images 
reviewed with lung, soft tissue, and bone windows. Reconstructed coronal and sagittal MPR images revi
ewed. All images stored on PACS.

All CT scanners at this facility use dose modulation, iterative reconstruction, and/or weight based d
osing when appropriate to reduce radiation dose to as low as reasonably achievable (ALARA).

[facility]: Dose Right  [facility]: CareDose    [facility]: Dose Right    [facility]: Teradose 4D    [facility]: Smart Shoptiques



RADIATION DOSE:  CT Rad equipment meets quality standard of care and radiation dose reduction techniq
ues were employed. CTDIvol: 5.0 mGy. DLP: 252 mGy-cm.mGy.



LIMITATIONS:  None.



FINDINGS:  LOWER CHEST: No significant findings. No nodules or infiltrates.

NON-CONTRASTED LIVER, SPLEEN, ADRENALS: Evaluation limited by lack of IV contrast. No identified sign
ificant masses.

PANCREAS: No masses. No peripancreatic inflammatory changes.

GALLBLADDER: Contracted.  No stones.

RIGHT KIDNEY AND URETER: No suspicious masses. Assessment limited by lack of IV contrast.   No signif
icant calcifications.   No hydronephrosis or hydroureter.

LEFT KIDNEY AND URETER: No suspicious masses. Assessment limited by lack of IV contrast.   No signifi
cant calcifications.   No hydronephrosis or hydroureter.

AORTA AND RETROPERITONEUM: No aneurysm. No retroperitoneal masses or adenopathy.

BOWEL AND PERITONEAL CAVITY: No obvious masses or inflammatory changes. No free fluid.

APPENDIX: Normal.

PELVIS, BLADDER, AND ABDOMINAL WALL:No abnormal masses. No free fluid. Bladder normal.

BONES: No significant findings.

OTHER: No other significant finding.



IMPRESSION:  NO SIGNIFICANT OR ACUTE PROCESS IN THE ABDOMEN OR PELVIS.



COMMENT:  Quality ID # [id number]: Final reports with documentation of one or more dose reduction techniques
 (e.g., Automated exposure control, adjustment of the mA and/or kV according to patient size, use of 
iterative reconstruction technique)



TECHNICAL DOCUMENTATION:  JOB ID:  [id number]

 2011 [employer]- All Rights Reserved



Reading location - IP/workstation name: [email or web address]

## 2020-05-27 NOTE — ER DOCUMENT REPORT
ED Medical Screen (RME)





- General


Chief Complaint: Flank Pain


Stated Complaint: FLANK PAIN/VAGINAL BLEEDING


Time Seen by Provider: 20 16:26


Primary Care Provider: 


NADIA MORENO MD [Primary Care Provider] - Follow up as needed


Mode of Arrival: Ambulatory


Information source: Patient


Notes: 





29-year-old female presented to ED for complaint of left flank pain.  She states

it started with a blood clot and then she started having a lot of blood in her 

urine.  She states her last menstrual period was May 6 as she knows is not a..  

She has no nausea no vomiting just a lot of pain on the left flank.  She states 

her only past medical history is epilepsy.  She is alert oriented respirations 

regular and unlabored speaking in full sentences.














I have greeted and performed a rapid initial assessment of this patient.  A 

comprehensive ED assessment and evaluation of the patient, analysis of test 

results and completion of medical decision making process will be conducted by 

an additional ED providers.


TRAVEL OUTSIDE OF THE [place] IN LAST 30 DAYS: No





- Related Data


Allergies/Adverse Reactions: 


                                        





latex [Latex] Allergy (Severe, Verified 20 16:20)


   swelling











Past Medical History





- Social History


Frequency of alcohol use: None


Drug Abuse: None





- Past Medical History


Cardiac Medical History: Reports: Hx Heart Murmur


   Denies: Hx Coronary Artery Disease, Hx Hypertension


Pulmonary Medical History: 


   Denies: Hx Asthma, Hx COPD


Neurological Medical History: Reports: Hx Seizures.  Denies: Hx Cerebrovascular 

Accident


Endocrine Medical History: Denies: Hx Diabetes Mellitus Type 1, Hx Diabetes 

Mellitus Type 2


Renal/ Medical History: Denies: Hx Peritoneal Dialysis


GI Medical History: Reports: Hx Gastroesophageal Reflux Disease, Hx Ulcer - 

gastric hx of.  Denies: Hx Hiatal Hernia


Musculoskeltal Medical History: Denies Hx Arthritis, Denies Hx Gout


Skin Medical History: Denies Hx Eczema, Denies Hx Psoriasis


Psychiatric Medical History: 


Infectious Medical History: Denies: Hx HIV


Past Surgical History: Reports: Hx  Section - x 3, Hx Tubal Ligation





- Immunizations


Immunizations up to date: Yes


Hx Diphtheria, Pertussis, Tetanus Vaccination: Yes - 





Physical Exam





- Vital signs


Vitals: 





                                        











Temp Pulse Resp BP Pulse Ox


 


 98.5 F   70   16   143/89 H  98 


 


 20 16:11  20 16:11  20 16:11  20 16:11  20 16:11














Course





- Vital Signs


Vital signs: 





                                        











Temp Pulse Resp BP Pulse Ox


 


 98.5 F   70   16   143/89 H  98 


 


 20 16:21  20 16:11  20 16:11  20 16:11  20 16:11














Doctor's Discharge





- Discharge


Referrals: 


NADIA MORENO MD [Primary Care Provider] - Follow up as needed

## 2020-05-27 NOTE — ER DOCUMENT REPORT
ED General





- General


Chief Complaint: Flank Pain


Stated Complaint: FLANK PAIN/VAGINAL BLEEDING


Time Seen by Provider: 20 16:26


Primary Care Provider: 


NADIA MORENO MD [Primary Care Provider] - Follow up as needed


Mode of Arrival: Ambulatory


TRAVEL OUTSIDE OF THE [place] IN LAST 30 DAYS: No





- HPI


Notes: 





Patient is a 29-year-old female who presents to the emergency department for 

evaluation of left flank pain.  She states she woke up this morning and pain 

under her left ribs, and into her back.  She described it as sharp.  Is worsened

by movement.  It is not worsened by deep breaths.  She states that later she 

went to urinate, she states she noticed a blood clot.  There is blood when she 

wipes after urination.  No other bleeding.  She has had no fevers or chills.  No

nausea or vomiting.  She does note some urinary frequency this been happening 

throughout the day as well.  Normal bowel movements.





- Related Data


Allergies/Adverse Reactions: 


                                        





latex [Latex] Allergy (Severe, Verified 20 16:20)


   swelling








Home Medications: Topamax





Past Medical History





- General


Information source: Patient





- Social History


Smoking Status: Current Every Day Smoker


Frequency of alcohol use: None


Drug Abuse: None


Family History: Reviewed & Not Pertinent, CAD, CVA, DM, Hypertension, Malignancy


Patient has homicidal ideation: No





- Past Medical History


Cardiac Medical History: Reports: Hx Heart Murmur


   Denies: Hx Coronary Artery Disease, Hx Hypertension


Pulmonary Medical History: 


   Denies: Hx Asthma, Hx COPD


Neurological Medical History: Reports: Hx Seizures.  Denies: Hx Cerebrovascular 

Accident


Endocrine Medical History: Denies: Hx Diabetes Mellitus Type 1, Hx Diabetes 

Mellitus Type 2


Renal/ Medical History: Denies: Hx Peritoneal Dialysis


GI Medical History: Reports: Hx Gastroesophageal Reflux Disease, Hx Ulcer - 

gastric hx of.  Denies: Hx Hiatal Hernia


Musculoskeletal Medical History: Denies Hx Arthritis, Denies Hx Gout


Skin Medical History: Denies Hx Eczema, Denies Hx Psoriasis


Psychiatric Medical History: 


Infectious Medical History: Denies: Hx HIV


Past Surgical History: Reports: Hx  Section - x 3, Hx Tubal Ligation





- Immunizations


Immunizations up to date: Yes


Hx Diphtheria, Pertussis, Tetanus Vaccination: Yes - 





Review of Systems





- Review of Systems


Genitourinary: See HPI


Musculoskeletal: See HPI


-: Yes All other systems reviewed and negative





Physical Exam





- Vital signs


Vitals: 


                                        











Temp Pulse Resp BP Pulse Ox


 


 98.5 F   70   16   143/89 H  98 


 


 20 16:11  20 16:11  20 16:11  20 16:11  20 16:11














- Notes


Notes: 





Vital signs reviewed, please refer to chart. Head is normocephalic, atraumatic. 

 Pupils equal round, reactive to light.  Neck is supple without meningismus.  

Heart is regular rate and rhythm.  Lungs are clear to auscultation bilaterally. 

 Abdomen is soft, mildly tender in the suprapubic region, normoactive bowel 

sounds throughout.  CVA tenderness noted on the left.  Extremities without 

cyanosis, clubbing. Posterior calves are nontender.  Peripheral pulses are 

equal.  Skin is warm and dry.  Patient is awake, alert, neurological exam is 

nonfocal.





Course





- Re-evaluation


Re-evalutation: 





20 20:01


Patient presents to the emergency department for evaluation.  She was initially 

seen through triage.  She had laboratory investigations as ordered.  She had 

hematuria noted, but no other significant lab abnormalities.  CT scan of the 

abdomen and pelvis failed to reveal any acute intra-abdominal process.  Given 

her symptoms, I do believe she has a diet.  She does have some left flank pain, 

but fails to have any significant leukocytosis, fevers, nausea, or vomiting.  

She is given Toradol and Percocet here to help her with her pain.  I will start 

on some antibiotics.  I will send her urine for culture.  She is to follow-up 

with primary care next week, return to the emergency department worsening or new

 concerning symptoms of any sort.





- Vital Signs


Vital signs: 


                                        











Temp Pulse Resp BP Pulse Ox


 


 98.5 F   70   16   143/89 H  98 


 


 20 16:21  20 16:11  20 16:11  20 16:11  20 16:11














- Laboratory


Result Diagrams: 


                                 20 16:40





                                 20 16:40


Laboratory results interpreted by me: 


                                        











  20





  16:40 16:40


 


RDW  14.3 H 


 


Plt Count  121 L 


 


Urine Blood   MODERATE H














- Diagnostic Test


Radiology reviewed: Reports reviewed


Radiology results interpreted by me: 





05/27/20 20:01





                                        





Abdomen/Pelvis CT  20 16:27


IMPRESSION:  NO SIGNIFICANT OR ACUTE PROCESS IN THE ABDOMEN OR PELVIS.


 














Discharge





- Discharge


Clinical Impression: 


Urinary tract infection


Qualifiers:


 Urinary tract infection type: acute cystitis Hematuria presence: with hematuria

 Qualified Code(s): N30.01 - Acute cystitis with hematuria





Condition: Stable


Disposition: HOME, SELF-CARE


Instructions:  Cephalexin ([facility]), Urinary Tract Infection ([facility])


Additional Instructions: 


Take all the antibiotic as prescribed until gone.  Take Tylenol or ibuprofen at 

home as needed for pain.  Stay well-hydrated.  Your urine has been sent for 

culture, you will be contacted if your infection is caused by bacteria resistant

 to the medication you have been prescribed.  Follow-up with your primary care 

provider next week.  If you develop fevers, vomiting, worsening pain, or any 

other new or concerning symptoms, please return immediately to the emergency 

department for evaluation.


Referrals: 


NADIA MORENO MD [Primary Care Provider] - Follow up as needed

## 2020-07-10 ENCOUNTER — HOSPITAL ENCOUNTER (OUTPATIENT)
Dept: HOSPITAL 62 - END | Age: 30
Discharge: HOME | End: 2020-07-10
Attending: INTERNAL MEDICINE
Payer: MEDICAID

## 2020-07-10 VITALS — DIASTOLIC BLOOD PRESSURE: 80 MMHG | SYSTOLIC BLOOD PRESSURE: 129 MMHG

## 2020-07-10 DIAGNOSIS — J45.909: ICD-10-CM

## 2020-07-10 DIAGNOSIS — K52.9: ICD-10-CM

## 2020-07-10 DIAGNOSIS — R01.1: ICD-10-CM

## 2020-07-10 DIAGNOSIS — Z03.818: ICD-10-CM

## 2020-07-10 DIAGNOSIS — G40.909: ICD-10-CM

## 2020-07-10 DIAGNOSIS — K62.5: ICD-10-CM

## 2020-07-10 DIAGNOSIS — K64.8: Primary | ICD-10-CM

## 2020-07-10 DIAGNOSIS — D64.9: ICD-10-CM

## 2020-07-10 PROCEDURE — 88305 TISSUE EXAM BY PATHOLOGIST: CPT

## 2020-07-10 PROCEDURE — 87635 SARS-COV-2 COVID-19 AMP PRB: CPT

## 2020-07-10 PROCEDURE — 45380 COLONOSCOPY AND BIOPSY: CPT

## 2020-07-10 PROCEDURE — C9803 HOPD COVID-19 SPEC COLLECT: HCPCS

## 2020-07-10 NOTE — OPERATIVE REPORT
Operative Report


DATE OF SURGERY: 07/10/20


Operative Report: 





The risk, benefits and alternatives of the procedure including the risks of 

bleeding, perforation requiring surgery have been explained to the patient in 

detail and informed consent has been obtained.  The patient is placed in a left,

lateral decubital position.  Timeout was called.  Propofol medication is 

administered.  Rectal examination is done which did not reveal any masses, tears

or fissures.  An Olympus videoscope was introduced into the patient's rectum.  

Scope was then carefully advanced all the way to the cecum.  The cecum was 

identified by the usual anatomical landmarks including the ileocecal valve as 

well as the appendiceal office.  Photodocumentation is obtained.  Scope was then

sequentially pulled back via the rest segments of the colon including the 

ascending colon, hepatic flexure, transverse colon, splenic flexure, descending 

colon finding to the rectosigmoid portions of the colon.  Retroflexion maneuvers

performed.


PREOPERATIVE DIAGNOSIS: Rectal bleeding


POSTOPERATIVE DIAGNOSIS: Internal hemorrhoids.  Mild terminal ileitis status 

post biopsy


OPERATION: Colonoscopy with biopsy


SURGEON: ROLAND HERNANDEZ


ANESTHESIA: LMAC


TISSUE REMOVED OR ALTERED: As noted above.


COMPLICATIONS: 





None.


ESTIMATED BLOOD LOSS: None.


INTRAOPERATIVE FINDINGS: As noted above.


PROCEDURE: 





Patient tolerated the procedure well.


No immediate postprocedure complications are noted.


Patient is discharged in good condition.


Discharge date 7/10/2020.


Discharge diet: Regular.


Discharge activity: Regular.  2 to 3-week follow-up to discuss findings.





Patient is instructed call the office or proceed to the emergency room should 

there be any further problems or questions.  Wait on the pathology.

## 2020-08-25 ENCOUNTER — HOSPITAL ENCOUNTER (EMERGENCY)
Dept: HOSPITAL 62 - ER | Age: 30
Discharge: HOME | End: 2020-08-25
Payer: MEDICAID

## 2020-08-25 VITALS — SYSTOLIC BLOOD PRESSURE: 121 MMHG | DIASTOLIC BLOOD PRESSURE: 68 MMHG

## 2020-08-25 DIAGNOSIS — M79.672: Primary | ICD-10-CM

## 2020-08-25 DIAGNOSIS — M25.572: ICD-10-CM

## 2020-08-25 DIAGNOSIS — F17.200: ICD-10-CM

## 2020-08-25 PROCEDURE — 99283 EMERGENCY DEPT VISIT LOW MDM: CPT

## 2020-08-25 PROCEDURE — 73630 X-RAY EXAM OF FOOT: CPT

## 2020-08-25 NOTE — ER DOCUMENT REPORT
HPI





- HPI


Time Seen by Provider: 20 16:14


Pain Level: 4


Notes: 





29-year-old female presents emergency room for evaluation of left foot pain that

started 5 days ago and now is radiating up to her left ankle.  Patient reports 

pain is 3 out of 5, throbbing achy.  Has tried soaking it without relief.  

Denies any trauma or fall to her foot.  Denies any previous injury.  Has not 

been seen by her primary care for this issue.








MEDICATIONS: I agree with the patient medications as charted by the RN.





ALLERGIES: I agree with the allergies as charted by the RN.





PAST MEDICAL HISTORY/PAST SURGICAL HISTORY: Reviewed and agree as charted by RN.





SOCIAL HISTORY: Reviewed and agree as charted by RN.





FAMILY HISTORY: No significant familial comorbid conditions directly related to 

patient complaint





EXAM:


Reviewed vital signs as charted by RN.





REVIEW OF SYSTEMS:reviewed vital signs by RN


CONSTITUTIONAL :  Denies fever,  chills, or sweats.  Denies recent illness.


EENT:   Denies eye, ear, throat, or mouth pain or symptoms.  Denies nasal or 

sinus congestion or discharge.  Denies throat, tongue, or mouth swelling or 

difficulty swallowing.


CARDIOVASCULAR:  Denies chest pain.  Denies palpitations or racing or irregular 

heart beat.  Denies ankle edema.


RESPIRATORY:  Denies cough, cold, or chest congestion.  Denies shortness of 

breath, difficulty breathing, or wheezing.


GASTROINTESTINAL:  Denies abdominal pain or distention.  Denies nausea, 

vomiting, or diarrhea.  Denies blood in vomitus, stools, or per rectum.  Denies 

black, tarry stools.  Denies constipation. 


GENITOURINARY:  Denies difficulty urinating, painful urination, burning, 

frequency, blood in urine, or discharge.


FEMALE  GENITOURINARY:  Denies vaginal bleeding, heavy or abnormal periods, 

irregular periods.  Denies vaginal discharge or odor. 


MUSCULOSKELETAL: Reports left foot pain.  denies back or neck pain or stiffness.

 Denies joint pain or swelling.


SKIN:   Denies rash, lesions or sores.


HEMATOLOGIC :   Denies easy bruising or bleeding.


LYMPHATIC:  Denies swollen, enlarged glands.


NEUROLOGICAL:  Denies confusion or altered mental status.  Denies passing out or

loss of consciousness.  Denies dizziness or lightheadedness.  Denies headache.  

Denies weakness or paralysis or loss of use of either side.  Denies problems 

with gait or speech.  Denies sensory loss, numbness, or tingling.  Denies 

seizures.


PSYCHIATRIC:  Denies anxiety or stress.  Denies depression, suicidal ideation, 

or homicidal ideation.





ALL OTHER SYSTEMS REVIEWED AND NEGATIVE.











PHYSICAL EXAMINATION:





GENERAL: Well-appearing, well-nourished and in no acute distress.





HEAD: Atraumatic, normocephalic.





EYES: Pupils equal round and reactive to light, extraocular movements intact, 

conjunctiva are normal.





ENT: Nares patent, oropharynx clear without exudates.  Moist mucous membranes.





NECK: Normal range of motion, supple without lymphadenopathy





LUNGS: Breath sounds clear to auscultation bilaterally and equal.  No wheezes 

rales or rhonchi.





HEART: Regular rate and rhythm without murmurs





ABDOMEN: Soft, nontender, nondistended abdomen.  No guarding, no rebound.  No 

masses appreciated.





Female : deferred





Musculoskeletal: Normal range of motion, no pitting or edema.  No cyanosis. left

foot with STS and tenderness on plantar aspect with palpation. Unable to palpate

a step-off. No open lesions. squeeze test negative. dtr +2 BLE. Limited APROM.  

distal pulses + 2 in BUE. full motor and sensory function. No  vascular 

compromise. Ankle exam within normal limits. No noted lacerations, lesions, 

ulcers or break in the skin. 








NEUROLOGICAL: Cranial nerves grossly intact.  Normal speech, normal gait.  

Normal sensory, motor exams





PSYCH: Normal mood, normal affect.





SKIN: Warm, Dry, normal turgor, no rashes or lesions noted.

















Dictation was performed using Dragon voice recognition software





- REPRODUCTIVE


Reproductive: DENIES: Pregnant:





Past Medical History





- General


Information source: Patient





- Social History


Smoking Status: Current Every Day Smoker


Frequency of alcohol use: Occasional


Drug Abuse: None


Family History: Reviewed & Not Pertinent, CAD, CVA, DM, Hypertension, Malignancy





- Past Medical History


Cardiac Medical History: Reports: Hx Heart Murmur


   Denies: Hx Coronary Artery Disease, Hx Heart Attack, Hx Hypertension


Pulmonary Medical History: 


   Denies: Hx Asthma, Hx Bronchitis, Hx COPD, Hx Pneumonia


Neurological Medical History: Reports: Hx Seizures - no meds  last x 1 mo ago.  

Denies: Hx Cerebrovascular Accident


Endocrine Medical History: Denies: Hx Diabetes Mellitus Type 1, Hx Diabetes 

Mellitus Type 2


Renal/ Medical History: Denies: Hx Peritoneal Dialysis


GI Medical History: Reports: Hx Gastroesophageal Reflux Disease, Hx Ulcer - 

gastric hx of.  Denies: Hx Hiatal Hernia


Musculoskeletal Medical History: Denies Hx Arthritis, Denies Hx Gout


Skin Medical History: Denies Hx Eczema, Denies Hx Psoriasis


Psychiatric Medical History: 


Infectious Medical History: Denies: Hx HIV


Past Surgical History: Reports: Hx  Section - x 3, Hx Tubal Ligation





- Immunizations


Immunizations up to date: Yes


Hx Diphtheria, Pertussis, Tetanus Vaccination: Yes - 





Vertical Provider Document





- CONSTITUTIONAL


Agree With Documented VS: Yes


Exam Limitations: No Limitations


General Appearance: WD/WN





- INFECTION CONTROL


TRAVEL OUTSIDE OF THE [place] IN LAST 30 DAYS: No





Course





- Re-evaluation


Re-evalutation: 





20 17:18


Afebrile vital stable no distress.  Nurses notes reviewed.  X-ray negative for 

any acute fracture, foreign body or dislocation of left foot.  Discussed with 

patient that she does need to follow-up with podiatry as well as her primary 

care provider, patient given crutches and a postop shoe for her left foot.  

Advised to take meloxicam as directed, do not take any other NSAIDs.  After 

performing a Medical Screening Examination, I estimate there is LOW risk for 

OPEN FRACTURE, COMPARTMENT SYNDROME, DEEP VENOUS THROMBOSIS, ACUTE TENDON 

RUPTURE, or NEUROVASCULAR INJURY thus I consider the discharge disposition 

reasonable.  I have reevaluated this patient multiple times and no significant 

life threatening changes are noted. The patient and I have discussed the 

diagnosis and risks, and we agree with discharging home to closely follow-up 

with their primary doctor or the referral orthopedist with the understanding 

that symptoms and presentations can change. We also discussed returning to the 

Emergency Department immediately if new or worsening symptoms occur. We have 

discussed the symptoms which are most concerning (e.g., changing or worsening 

pain, numbness, weakness) that necessitate immediate return





- Vital Signs


Vital signs: 


                                        











Temp Pulse Resp BP Pulse Ox


 


 98.1 F   73   16   121/68   99 


 


 20 15:54  20 15:54  20 15:54  20 15:54  20 15:54














Discharge





- Discharge


Clinical Impression: 


 Left foot pain





Condition: Stable


Disposition: HOME, SELF-CARE


Additional Instructions: 


Your foot pain is likely due to your callus formation which likely is from a 

plantar wart that you have on the bottom of your left foot.  Your x-rays were 

negative for any foreign bodies, fracture or dislocation.  You were given 

crutches and a postop shoe to help ambulate.  It is recommended that you follow-

up with either your primary care provider or podiatrist for further evaluation 

of your callus.


Return immediately for any new or worsening symptoms.





Follow up with primary care provider, call tomorrow to make followup 

appointment.





Prescriptions: 


Meloxicam [Mobic] 7.5 mg PO DAILY #7 tablet


Referrals: 


NADIA MORENO MD [Primary Care Provider] - Follow up as needed


LUC ALAMO DPM [ACTIVE STAFF] - Follow up as needed

## 2020-08-25 NOTE — RADIOLOGY REPORT (SQ)
EXAM DESCRIPTION:  FOOT LEFT COMPLETE



IMAGES COMPLETED DATE/TIME:  8/25/2020 4:45 pm



REASON FOR STUDY:  left foot pain x 5 days, r/o fx or fb



COMPARISON:  None.



NUMBER OF VIEWS:  Three views.



TECHNIQUE:  AP, lateral and oblique  radiographic images acquired of the left foot.



LIMITATIONS:  None.



FINDINGS:  MINERALIZATION: Normal.

BONES: No acute fracture or dislocation.  No worrisome bone lesions.

JOINTS:  Subluxations at the proximal interphalangeal joint second through the fourth toes.

SOFT TISSUES: No soft tissue swelling.  No foreign body.

OTHER: No other significant finding.



IMPRESSION:  1.  No acute osseous findings.  No evidence of foreign objects.

2. Subluxations at the proximal interphalangeal joint second through fourth toes.



TECHNICAL DOCUMENTATION:  JOB ID:  [id number]

 2011 [employer]- All Rights Reserved



Reading location - IP/workstation name: [email or web address]